# Patient Record
Sex: MALE | Race: BLACK OR AFRICAN AMERICAN | NOT HISPANIC OR LATINO | Employment: UNEMPLOYED | URBAN - METROPOLITAN AREA
[De-identification: names, ages, dates, MRNs, and addresses within clinical notes are randomized per-mention and may not be internally consistent; named-entity substitution may affect disease eponyms.]

---

## 2024-02-06 ENCOUNTER — CLINICAL SUPPORT (OUTPATIENT)
Dept: EMERGENCY MEDICINE | Facility: HOSPITAL | Age: 34
End: 2024-02-06
Payer: MEDICAID

## 2024-02-06 ENCOUNTER — HOSPITAL ENCOUNTER (INPATIENT)
Facility: HOSPITAL | Age: 34
LOS: 1 days | Discharge: HOME | End: 2024-02-08
Attending: EMERGENCY MEDICINE | Admitting: SURGERY
Payer: MEDICAID

## 2024-02-06 ENCOUNTER — APPOINTMENT (OUTPATIENT)
Dept: RADIOLOGY | Facility: HOSPITAL | Age: 34
End: 2024-02-06
Payer: MEDICAID

## 2024-02-06 DIAGNOSIS — L02.213 CHEST WALL ABSCESS: Primary | ICD-10-CM

## 2024-02-06 LAB
ABO GROUP (TYPE) IN BLOOD: NORMAL
ALBUMIN SERPL BCP-MCNC: 3.5 G/DL (ref 3.4–5)
ALP SERPL-CCNC: 102 U/L (ref 33–120)
ALT SERPL W P-5'-P-CCNC: 13 U/L (ref 10–52)
ANION GAP BLDV CALCULATED.4IONS-SCNC: 10 MMOL/L (ref 10–25)
ANION GAP SERPL CALC-SCNC: 21 MMOL/L (ref 10–20)
ANTIBODY SCREEN: NORMAL
AST SERPL W P-5'-P-CCNC: 21 U/L (ref 9–39)
BASE EXCESS BLDV CALC-SCNC: 4.3 MMOL/L (ref -2–3)
BASOPHILS # BLD AUTO: 0.1 X10*3/UL (ref 0–0.1)
BASOPHILS NFR BLD AUTO: 0.4 %
BILIRUB SERPL-MCNC: 0.4 MG/DL (ref 0–1.2)
BODY TEMPERATURE: 37 DEGREES CELSIUS
BUN SERPL-MCNC: 7 MG/DL (ref 6–23)
CA-I BLDV-SCNC: 1.15 MMOL/L (ref 1.1–1.33)
CALCIUM SERPL-MCNC: 9.1 MG/DL (ref 8.6–10.6)
CHLORIDE BLDV-SCNC: 97 MMOL/L (ref 98–107)
CHLORIDE SERPL-SCNC: 92 MMOL/L (ref 98–107)
CO2 SERPL-SCNC: 25 MMOL/L (ref 21–32)
CREAT SERPL-MCNC: 0.67 MG/DL (ref 0.5–1.3)
CREAT SERPL-MCNC: 0.69 MG/DL (ref 0.5–1.3)
CRP SERPL-MCNC: 24.55 MG/DL
EGFRCR SERPLBLD CKD-EPI 2021: >90 ML/MIN/1.73M*2
EGFRCR SERPLBLD CKD-EPI 2021: >90 ML/MIN/1.73M*2
EOSINOPHIL # BLD AUTO: 0.14 X10*3/UL (ref 0–0.7)
EOSINOPHIL NFR BLD AUTO: 0.5 %
ERYTHROCYTE [DISTWIDTH] IN BLOOD BY AUTOMATED COUNT: 15 % (ref 11.5–14.5)
ERYTHROCYTE [SEDIMENTATION RATE] IN BLOOD BY WESTERGREN METHOD: >130 MM/H (ref 0–15)
FIBRINOGEN PPP-MCNC: 805 MG/DL (ref 200–400)
GLUCOSE BLDV-MCNC: 126 MG/DL (ref 74–99)
GLUCOSE SERPL-MCNC: 107 MG/DL (ref 74–99)
HCO3 BLDV-SCNC: 29.2 MMOL/L (ref 22–26)
HCT VFR BLD AUTO: 27.1 % (ref 41–52)
HCT VFR BLD EST: 33 % (ref 41–52)
HGB BLD-MCNC: 9.3 G/DL (ref 13.5–17.5)
HGB BLDV-MCNC: 10.9 G/DL (ref 13.5–17.5)
HGB RETIC QN: 27 PG (ref 28–38)
IMM GRANULOCYTES # BLD AUTO: 0.21 X10*3/UL (ref 0–0.7)
IMM GRANULOCYTES NFR BLD AUTO: 0.8 % (ref 0–0.9)
IMMATURE RETIC FRACTION: 15.3 %
INR PPP: 1.8 (ref 0.9–1.1)
LACTATE BLDV-SCNC: 2 MMOL/L (ref 0.4–2)
LDH SERPL L TO P-CCNC: 162 U/L (ref 84–246)
LYMPHOCYTES # BLD AUTO: 4.44 X10*3/UL (ref 1.2–4.8)
LYMPHOCYTES NFR BLD AUTO: 16.5 %
MAGNESIUM SERPL-MCNC: 2.18 MG/DL (ref 1.6–2.4)
MCH RBC QN AUTO: 23 PG (ref 26–34)
MCHC RBC AUTO-ENTMCNC: 34.3 G/DL (ref 32–36)
MCV RBC AUTO: 67 FL (ref 80–100)
MONOCYTES # BLD AUTO: 1.78 X10*3/UL (ref 0.1–1)
MONOCYTES NFR BLD AUTO: 6.6 %
NEUTROPHILS # BLD AUTO: 20.16 X10*3/UL (ref 1.2–7.7)
NEUTROPHILS NFR BLD AUTO: 75.2 %
NRBC BLD-RTO: 0 /100 WBCS (ref 0–0)
OXYHGB MFR BLDV: 46.4 % (ref 45–75)
PCO2 BLDV: 44 MM HG (ref 41–51)
PH BLDV: 7.43 PH (ref 7.33–7.43)
PLATELET # BLD AUTO: 759 X10*3/UL (ref 150–450)
PO2 BLDV: 35 MM HG (ref 35–45)
POTASSIUM BLDV-SCNC: 4.4 MMOL/L (ref 3.5–5.3)
POTASSIUM SERPL-SCNC: 4.2 MMOL/L (ref 3.5–5.3)
PROT SERPL-MCNC: 8.7 G/DL (ref 6.4–8.2)
PROTHROMBIN TIME: 19.9 SECONDS (ref 9.8–12.8)
RBC # BLD AUTO: 4.05 X10*6/UL (ref 4.5–5.9)
RETICS #: 0.06 X10*6/UL (ref 0.02–0.12)
RETICS/RBC NFR AUTO: 1.4 % (ref 0.5–2)
RH FACTOR (ANTIGEN D): NORMAL
SAO2 % BLDV: 47 % (ref 45–75)
SODIUM BLDV-SCNC: 132 MMOL/L (ref 136–145)
SODIUM SERPL-SCNC: 134 MMOL/L (ref 136–145)
WBC # BLD AUTO: 26.8 X10*3/UL (ref 4.4–11.3)

## 2024-02-06 PROCEDURE — 83010 ASSAY OF HAPTOGLOBIN QUANT: CPT | Mod: WESLAB | Performed by: STUDENT IN AN ORGANIZED HEALTH CARE EDUCATION/TRAINING PROGRAM

## 2024-02-06 PROCEDURE — 85610 PROTHROMBIN TIME: CPT | Performed by: EMERGENCY MEDICINE

## 2024-02-06 PROCEDURE — 84132 ASSAY OF SERUM POTASSIUM: CPT | Performed by: EMERGENCY MEDICINE

## 2024-02-06 PROCEDURE — 71260 CT THORAX DX C+: CPT | Performed by: RADIOLOGY

## 2024-02-06 PROCEDURE — 70450 CT HEAD/BRAIN W/O DYE: CPT | Performed by: RADIOLOGY

## 2024-02-06 PROCEDURE — 86140 C-REACTIVE PROTEIN: CPT | Performed by: EMERGENCY MEDICINE

## 2024-02-06 PROCEDURE — 70450 CT HEAD/BRAIN W/O DYE: CPT

## 2024-02-06 PROCEDURE — 96365 THER/PROPH/DIAG IV INF INIT: CPT

## 2024-02-06 PROCEDURE — 85025 COMPLETE CBC W/AUTO DIFF WBC: CPT | Performed by: EMERGENCY MEDICINE

## 2024-02-06 PROCEDURE — 84132 ASSAY OF SERUM POTASSIUM: CPT

## 2024-02-06 PROCEDURE — 83735 ASSAY OF MAGNESIUM: CPT | Performed by: EMERGENCY MEDICINE

## 2024-02-06 PROCEDURE — 96367 TX/PROPH/DG ADDL SEQ IV INF: CPT

## 2024-02-06 PROCEDURE — 85652 RBC SED RATE AUTOMATED: CPT | Performed by: EMERGENCY MEDICINE

## 2024-02-06 PROCEDURE — 85384 FIBRINOGEN ACTIVITY: CPT | Performed by: STUDENT IN AN ORGANIZED HEALTH CARE EDUCATION/TRAINING PROGRAM

## 2024-02-06 PROCEDURE — 83615 LACTATE (LD) (LDH) ENZYME: CPT | Performed by: STUDENT IN AN ORGANIZED HEALTH CARE EDUCATION/TRAINING PROGRAM

## 2024-02-06 PROCEDURE — 93010 ELECTROCARDIOGRAM REPORT: CPT | Performed by: EMERGENCY MEDICINE

## 2024-02-06 PROCEDURE — 2500000004 HC RX 250 GENERAL PHARMACY W/ HCPCS (ALT 636 FOR OP/ED)

## 2024-02-06 PROCEDURE — 87040 BLOOD CULTURE FOR BACTERIA: CPT | Performed by: EMERGENCY MEDICINE

## 2024-02-06 PROCEDURE — 70491 CT SOFT TISSUE NECK W/DYE: CPT

## 2024-02-06 PROCEDURE — 36415 COLL VENOUS BLD VENIPUNCTURE: CPT | Performed by: EMERGENCY MEDICINE

## 2024-02-06 PROCEDURE — 82565 ASSAY OF CREATININE: CPT | Performed by: EMERGENCY MEDICINE

## 2024-02-06 PROCEDURE — 85045 AUTOMATED RETICULOCYTE COUNT: CPT | Performed by: STUDENT IN AN ORGANIZED HEALTH CARE EDUCATION/TRAINING PROGRAM

## 2024-02-06 PROCEDURE — 99291 CRITICAL CARE FIRST HOUR: CPT | Mod: 25 | Performed by: EMERGENCY MEDICINE

## 2024-02-06 PROCEDURE — 86901 BLOOD TYPING SEROLOGIC RH(D): CPT | Performed by: EMERGENCY MEDICINE

## 2024-02-06 PROCEDURE — 93005 ELECTROCARDIOGRAM TRACING: CPT

## 2024-02-06 PROCEDURE — 2550000001 HC RX 255 CONTRASTS: Performed by: EMERGENCY MEDICINE

## 2024-02-06 PROCEDURE — 70491 CT SOFT TISSUE NECK W/DYE: CPT | Performed by: RADIOLOGY

## 2024-02-06 PROCEDURE — 2500000004 HC RX 250 GENERAL PHARMACY W/ HCPCS (ALT 636 FOR OP/ED): Performed by: EMERGENCY MEDICINE

## 2024-02-06 PROCEDURE — 99285 EMERGENCY DEPT VISIT HI MDM: CPT | Performed by: EMERGENCY MEDICINE

## 2024-02-06 PROCEDURE — 71260 CT THORAX DX C+: CPT

## 2024-02-06 RX ORDER — HYDROMORPHONE HYDROCHLORIDE 1 MG/ML
INJECTION, SOLUTION INTRAMUSCULAR; INTRAVENOUS; SUBCUTANEOUS
Status: COMPLETED
Start: 2024-02-06 | End: 2024-02-06

## 2024-02-06 RX ORDER — SODIUM CHLORIDE, SODIUM LACTATE, POTASSIUM CHLORIDE, CALCIUM CHLORIDE 600; 310; 30; 20 MG/100ML; MG/100ML; MG/100ML; MG/100ML
125 INJECTION, SOLUTION INTRAVENOUS CONTINUOUS
Status: DISCONTINUED | OUTPATIENT
Start: 2024-02-06 | End: 2024-02-07

## 2024-02-06 RX ORDER — HYDROMORPHONE HYDROCHLORIDE 1 MG/ML
0.2 INJECTION, SOLUTION INTRAMUSCULAR; INTRAVENOUS; SUBCUTANEOUS ONCE
Status: COMPLETED | OUTPATIENT
Start: 2024-02-06 | End: 2024-02-06

## 2024-02-06 RX ORDER — VANCOMYCIN HYDROCHLORIDE 750 MG/150ML
1500 INJECTION, SOLUTION INTRAVENOUS ONCE
Status: COMPLETED | OUTPATIENT
Start: 2024-02-06 | End: 2024-02-06

## 2024-02-06 RX ORDER — LIDOCAINE HYDROCHLORIDE 10 MG/ML
10 INJECTION INFILTRATION; PERINEURAL ONCE
Status: DISCONTINUED | OUTPATIENT
Start: 2024-02-06 | End: 2024-02-08 | Stop reason: HOSPADM

## 2024-02-06 RX ADMIN — SODIUM CHLORIDE, POTASSIUM CHLORIDE, SODIUM LACTATE AND CALCIUM CHLORIDE 1000 ML: 600; 310; 30; 20 INJECTION, SOLUTION INTRAVENOUS at 20:17

## 2024-02-06 RX ADMIN — HYDROMORPHONE HYDROCHLORIDE 0.2 MG: 1 INJECTION, SOLUTION INTRAMUSCULAR; INTRAVENOUS; SUBCUTANEOUS at 20:31

## 2024-02-06 RX ADMIN — IOHEXOL 100 ML: 350 INJECTION, SOLUTION INTRAVENOUS at 22:05

## 2024-02-06 RX ADMIN — VANCOMYCIN HYDROCHLORIDE 1500 MG: 750 INJECTION, SOLUTION INTRAVENOUS at 21:10

## 2024-02-06 RX ADMIN — PIPERACILLIN SODIUM AND TAZOBACTAM SODIUM 4.5 G: 4; .5 INJECTION, SOLUTION INTRAVENOUS at 20:22

## 2024-02-06 ASSESSMENT — COLUMBIA-SUICIDE SEVERITY RATING SCALE - C-SSRS
6. HAVE YOU EVER DONE ANYTHING, STARTED TO DO ANYTHING, OR PREPARED TO DO ANYTHING TO END YOUR LIFE?: NO
2. HAVE YOU ACTUALLY HAD ANY THOUGHTS OF KILLING YOURSELF?: NO
1. IN THE PAST MONTH, HAVE YOU WISHED YOU WERE DEAD OR WISHED YOU COULD GO TO SLEEP AND NOT WAKE UP?: NO

## 2024-02-07 ENCOUNTER — ANESTHESIA (OUTPATIENT)
Dept: OPERATING ROOM | Facility: HOSPITAL | Age: 34
End: 2024-02-07
Payer: MEDICAID

## 2024-02-07 ENCOUNTER — ANESTHESIA EVENT (OUTPATIENT)
Dept: OPERATING ROOM | Facility: HOSPITAL | Age: 34
End: 2024-02-07
Payer: MEDICAID

## 2024-02-07 PROBLEM — F41.9 ANXIETY: Status: ACTIVE | Noted: 2024-02-07

## 2024-02-07 PROBLEM — L02.213 CHEST WALL ABSCESS: Status: ACTIVE | Noted: 2024-02-06

## 2024-02-07 LAB
ANION GAP SERPL CALC-SCNC: 10 MMOL/L (ref 10–20)
ATRIAL RATE: 112 BPM
BASOPHILS # BLD AUTO: 0.07 X10*3/UL (ref 0–0.1)
BASOPHILS NFR BLD AUTO: 0.4 %
BUN SERPL-MCNC: 5 MG/DL (ref 6–23)
CALCIUM SERPL-MCNC: 8.8 MG/DL (ref 8.6–10.6)
CHLORIDE SERPL-SCNC: 99 MMOL/L (ref 98–107)
CO2 SERPL-SCNC: 30 MMOL/L (ref 21–32)
CREAT SERPL-MCNC: 0.67 MG/DL (ref 0.5–1.3)
CRP SERPL-MCNC: 17.73 MG/DL
EGFRCR SERPLBLD CKD-EPI 2021: >90 ML/MIN/1.73M*2
EOSINOPHIL # BLD AUTO: 0.09 X10*3/UL (ref 0–0.7)
EOSINOPHIL NFR BLD AUTO: 0.5 %
ERYTHROCYTE [DISTWIDTH] IN BLOOD BY AUTOMATED COUNT: 15.7 % (ref 11.5–14.5)
GLUCOSE SERPL-MCNC: 127 MG/DL (ref 74–99)
HAPTOGLOB SERPL-MCNC: 625 MG/DL (ref 37–246)
HCT VFR BLD AUTO: 26.1 % (ref 41–52)
HGB BLD-MCNC: 8.5 G/DL (ref 13.5–17.5)
IMM GRANULOCYTES # BLD AUTO: 0.17 X10*3/UL (ref 0–0.7)
IMM GRANULOCYTES NFR BLD AUTO: 0.9 % (ref 0–0.9)
LYMPHOCYTES # BLD AUTO: 1.63 X10*3/UL (ref 1.2–4.8)
LYMPHOCYTES NFR BLD AUTO: 8.9 %
MAGNESIUM SERPL-MCNC: 2.16 MG/DL (ref 1.6–2.4)
MCH RBC QN AUTO: 23 PG (ref 26–34)
MCHC RBC AUTO-ENTMCNC: 32.6 G/DL (ref 32–36)
MCV RBC AUTO: 71 FL (ref 80–100)
MONOCYTES # BLD AUTO: 1.03 X10*3/UL (ref 0.1–1)
MONOCYTES NFR BLD AUTO: 5.6 %
NEUTROPHILS # BLD AUTO: 15.4 X10*3/UL (ref 1.2–7.7)
NEUTROPHILS NFR BLD AUTO: 83.7 %
NRBC BLD-RTO: 0 /100 WBCS (ref 0–0)
P AXIS: 63 DEGREES
P OFFSET: 191 MS
P ONSET: 140 MS
PLATELET # BLD AUTO: 605 X10*3/UL (ref 150–450)
POTASSIUM SERPL-SCNC: 3.4 MMOL/L (ref 3.5–5.3)
PR INTERVAL: 140 MS
Q ONSET: 210 MS
QRS COUNT: 18 BEATS
QRS DURATION: 90 MS
QT INTERVAL: 316 MS
QTC CALCULATION(BAZETT): 431 MS
QTC FREDERICIA: 389 MS
R AXIS: -28 DEGREES
RBC # BLD AUTO: 3.7 X10*6/UL (ref 4.5–5.9)
SODIUM SERPL-SCNC: 136 MMOL/L (ref 136–145)
T AXIS: 77 DEGREES
T OFFSET: 368 MS
VENTRICULAR RATE: 112 BPM
WBC # BLD AUTO: 18.4 X10*3/UL (ref 4.4–11.3)

## 2024-02-07 PROCEDURE — A4217 STERILE WATER/SALINE, 500 ML: HCPCS

## 2024-02-07 PROCEDURE — A4217 STERILE WATER/SALINE, 500 ML: HCPCS | Performed by: SURGERY

## 2024-02-07 PROCEDURE — 99222 1ST HOSP IP/OBS MODERATE 55: CPT | Performed by: SURGERY

## 2024-02-07 PROCEDURE — 3700000002 HC GENERAL ANESTHESIA TIME - EACH INCREMENTAL 1 MINUTE: Performed by: SURGERY

## 2024-02-07 PROCEDURE — 83735 ASSAY OF MAGNESIUM: CPT | Performed by: STUDENT IN AN ORGANIZED HEALTH CARE EDUCATION/TRAINING PROGRAM

## 2024-02-07 PROCEDURE — 2500000004 HC RX 250 GENERAL PHARMACY W/ HCPCS (ALT 636 FOR OP/ED)

## 2024-02-07 PROCEDURE — 2500000004 HC RX 250 GENERAL PHARMACY W/ HCPCS (ALT 636 FOR OP/ED): Performed by: SURGERY

## 2024-02-07 PROCEDURE — 90791 PSYCH DIAGNOSTIC EVALUATION: CPT | Performed by: PSYCHOLOGIST

## 2024-02-07 PROCEDURE — 80048 BASIC METABOLIC PNL TOTAL CA: CPT | Performed by: STUDENT IN AN ORGANIZED HEALTH CARE EDUCATION/TRAINING PROGRAM

## 2024-02-07 PROCEDURE — 10061 I&D ABSCESS COMP/MULTIPLE: CPT | Mod: GC | Performed by: SURGERY

## 2024-02-07 PROCEDURE — 2720000007 HC OR 272 NO HCPCS: Performed by: SURGERY

## 2024-02-07 PROCEDURE — 87070 CULTURE OTHR SPECIMN AEROBIC: CPT | Performed by: SURGERY

## 2024-02-07 PROCEDURE — 3700000001 HC GENERAL ANESTHESIA TIME - INITIAL BASE CHARGE: Performed by: SURGERY

## 2024-02-07 PROCEDURE — A10061 PR DRAIN SKIN ABSCESS COMPLIC: Performed by: ANESTHESIOLOGY

## 2024-02-07 PROCEDURE — 2500000004 HC RX 250 GENERAL PHARMACY W/ HCPCS (ALT 636 FOR OP/ED): Performed by: STUDENT IN AN ORGANIZED HEALTH CARE EDUCATION/TRAINING PROGRAM

## 2024-02-07 PROCEDURE — 1100000001 HC PRIVATE ROOM DAILY

## 2024-02-07 PROCEDURE — 2500000005 HC RX 250 GENERAL PHARMACY W/O HCPCS: Performed by: SURGERY

## 2024-02-07 PROCEDURE — 10061 I&D ABSCESS COMP/MULTIPLE: CPT | Performed by: SURGERY

## 2024-02-07 PROCEDURE — 7100000001 HC RECOVERY ROOM TIME - INITIAL BASE CHARGE: Performed by: SURGERY

## 2024-02-07 PROCEDURE — 85025 COMPLETE CBC W/AUTO DIFF WBC: CPT | Performed by: STUDENT IN AN ORGANIZED HEALTH CARE EDUCATION/TRAINING PROGRAM

## 2024-02-07 PROCEDURE — 3600000008 HC OR TIME - EACH INCREMENTAL 1 MINUTE - PROCEDURE LEVEL THREE: Performed by: SURGERY

## 2024-02-07 PROCEDURE — 36415 COLL VENOUS BLD VENIPUNCTURE: CPT | Performed by: STUDENT IN AN ORGANIZED HEALTH CARE EDUCATION/TRAINING PROGRAM

## 2024-02-07 PROCEDURE — 7100000002 HC RECOVERY ROOM TIME - EACH INCREMENTAL 1 MINUTE: Performed by: SURGERY

## 2024-02-07 PROCEDURE — 0W980ZZ DRAINAGE OF CHEST WALL, OPEN APPROACH: ICD-10-PCS | Performed by: SURGERY

## 2024-02-07 PROCEDURE — 99140 ANES COMP EMERGENCY COND: CPT | Performed by: ANESTHESIOLOGY

## 2024-02-07 PROCEDURE — 2500000004 HC RX 250 GENERAL PHARMACY W/ HCPCS (ALT 636 FOR OP/ED): Performed by: EMERGENCY MEDICINE

## 2024-02-07 PROCEDURE — 2500000004 HC RX 250 GENERAL PHARMACY W/ HCPCS (ALT 636 FOR OP/ED): Performed by: NURSE PRACTITIONER

## 2024-02-07 PROCEDURE — 86140 C-REACTIVE PROTEIN: CPT | Performed by: STUDENT IN AN ORGANIZED HEALTH CARE EDUCATION/TRAINING PROGRAM

## 2024-02-07 PROCEDURE — 2500000001 HC RX 250 WO HCPCS SELF ADMINISTERED DRUGS (ALT 637 FOR MEDICARE OP): Performed by: STUDENT IN AN ORGANIZED HEALTH CARE EDUCATION/TRAINING PROGRAM

## 2024-02-07 PROCEDURE — 3600000003 HC OR TIME - INITIAL BASE CHARGE - PROCEDURE LEVEL THREE: Performed by: SURGERY

## 2024-02-07 RX ORDER — ACETAMINOPHEN 325 MG/1
650 TABLET ORAL EVERY 6 HOURS
Status: DISCONTINUED | OUTPATIENT
Start: 2024-02-07 | End: 2024-02-08 | Stop reason: HOSPADM

## 2024-02-07 RX ORDER — HYDROMORPHONE HYDROCHLORIDE 1 MG/ML
0.2 INJECTION, SOLUTION INTRAMUSCULAR; INTRAVENOUS; SUBCUTANEOUS EVERY 5 MIN PRN
Status: DISCONTINUED | OUTPATIENT
Start: 2024-02-07 | End: 2024-02-07 | Stop reason: HOSPADM

## 2024-02-07 RX ORDER — LABETALOL HYDROCHLORIDE 5 MG/ML
5 INJECTION, SOLUTION INTRAVENOUS ONCE AS NEEDED
Status: DISCONTINUED | OUTPATIENT
Start: 2024-02-07 | End: 2024-02-07 | Stop reason: HOSPADM

## 2024-02-07 RX ORDER — FENTANYL CITRATE 50 UG/ML
INJECTION, SOLUTION INTRAMUSCULAR; INTRAVENOUS AS NEEDED
Status: DISCONTINUED | OUTPATIENT
Start: 2024-02-07 | End: 2024-02-07

## 2024-02-07 RX ORDER — IBUPROFEN 600 MG/1
600 TABLET ORAL EVERY 6 HOURS
Status: DISCONTINUED | OUTPATIENT
Start: 2024-02-07 | End: 2024-02-08 | Stop reason: HOSPADM

## 2024-02-07 RX ORDER — HYDROMORPHONE HYDROCHLORIDE 1 MG/ML
0.2 INJECTION, SOLUTION INTRAMUSCULAR; INTRAVENOUS; SUBCUTANEOUS ONCE
Status: COMPLETED | OUTPATIENT
Start: 2024-02-07 | End: 2024-02-07

## 2024-02-07 RX ORDER — OXYCODONE HYDROCHLORIDE 5 MG/1
5 TABLET ORAL EVERY 6 HOURS PRN
Status: DISCONTINUED | OUTPATIENT
Start: 2024-02-07 | End: 2024-02-08 | Stop reason: HOSPADM

## 2024-02-07 RX ORDER — HEPARIN SODIUM 5000 [USP'U]/ML
INJECTION, SOLUTION INTRAVENOUS; SUBCUTANEOUS AS NEEDED
Status: DISCONTINUED | OUTPATIENT
Start: 2024-02-07 | End: 2024-02-07

## 2024-02-07 RX ORDER — SODIUM CHLORIDE 0.9 G/100ML
IRRIGANT IRRIGATION AS NEEDED
Status: DISCONTINUED | OUTPATIENT
Start: 2024-02-07 | End: 2024-02-07 | Stop reason: HOSPADM

## 2024-02-07 RX ORDER — PROPOFOL 10 MG/ML
INJECTION, EMULSION INTRAVENOUS CONTINUOUS PRN
Status: DISCONTINUED | OUTPATIENT
Start: 2024-02-07 | End: 2024-02-07

## 2024-02-07 RX ORDER — LIDOCAINE HYDROCHLORIDE 5 MG/ML
INJECTION, SOLUTION INFILTRATION; PERINEURAL AS NEEDED
Status: DISCONTINUED | OUTPATIENT
Start: 2024-02-07 | End: 2024-02-07 | Stop reason: HOSPADM

## 2024-02-07 RX ORDER — POTASSIUM CHLORIDE 20 MEQ/1
40 TABLET, EXTENDED RELEASE ORAL ONCE
Status: COMPLETED | OUTPATIENT
Start: 2024-02-07 | End: 2024-02-07

## 2024-02-07 RX ORDER — POLYETHYLENE GLYCOL 3350 17 G/17G
17 POWDER, FOR SOLUTION ORAL DAILY
Status: DISCONTINUED | OUTPATIENT
Start: 2024-02-07 | End: 2024-02-08 | Stop reason: HOSPADM

## 2024-02-07 RX ORDER — PROPOFOL 10 MG/ML
INJECTION, EMULSION INTRAVENOUS AS NEEDED
Status: DISCONTINUED | OUTPATIENT
Start: 2024-02-07 | End: 2024-02-07

## 2024-02-07 RX ORDER — HYDROMORPHONE HYDROCHLORIDE 1 MG/ML
0.5 INJECTION, SOLUTION INTRAMUSCULAR; INTRAVENOUS; SUBCUTANEOUS EVERY 5 MIN PRN
Status: DISCONTINUED | OUTPATIENT
Start: 2024-02-07 | End: 2024-02-07 | Stop reason: HOSPADM

## 2024-02-07 RX ORDER — KETOROLAC TROMETHAMINE 15 MG/ML
15 INJECTION, SOLUTION INTRAMUSCULAR; INTRAVENOUS ONCE
Status: COMPLETED | OUTPATIENT
Start: 2024-02-07 | End: 2024-02-07

## 2024-02-07 RX ORDER — ONDANSETRON HYDROCHLORIDE 2 MG/ML
4 INJECTION, SOLUTION INTRAVENOUS ONCE AS NEEDED
Status: DISCONTINUED | OUTPATIENT
Start: 2024-02-07 | End: 2024-02-07 | Stop reason: HOSPADM

## 2024-02-07 RX ORDER — SODIUM CHLORIDE, SODIUM LACTATE, POTASSIUM CHLORIDE, CALCIUM CHLORIDE 600; 310; 30; 20 MG/100ML; MG/100ML; MG/100ML; MG/100ML
50 INJECTION, SOLUTION INTRAVENOUS CONTINUOUS
Status: DISCONTINUED | OUTPATIENT
Start: 2024-02-07 | End: 2024-02-08

## 2024-02-07 RX ORDER — HEPARIN SODIUM 5000 [USP'U]/ML
5000 INJECTION, SOLUTION INTRAVENOUS; SUBCUTANEOUS EVERY 8 HOURS
Status: DISCONTINUED | OUTPATIENT
Start: 2024-02-07 | End: 2024-02-08 | Stop reason: HOSPADM

## 2024-02-07 RX ORDER — ONDANSETRON HYDROCHLORIDE 2 MG/ML
INJECTION, SOLUTION INTRAVENOUS AS NEEDED
Status: DISCONTINUED | OUTPATIENT
Start: 2024-02-07 | End: 2024-02-07

## 2024-02-07 RX ORDER — SODIUM CHLORIDE, SODIUM LACTATE, POTASSIUM CHLORIDE, CALCIUM CHLORIDE 600; 310; 30; 20 MG/100ML; MG/100ML; MG/100ML; MG/100ML
100 INJECTION, SOLUTION INTRAVENOUS CONTINUOUS
Status: DISCONTINUED | OUTPATIENT
Start: 2024-02-07 | End: 2024-02-07 | Stop reason: HOSPADM

## 2024-02-07 RX ORDER — KETOROLAC TROMETHAMINE 15 MG/ML
15 INJECTION, SOLUTION INTRAMUSCULAR; INTRAVENOUS EVERY 6 HOURS SCHEDULED
Status: DISCONTINUED | OUTPATIENT
Start: 2024-02-07 | End: 2024-02-08 | Stop reason: HOSPADM

## 2024-02-07 RX ORDER — ACETAMINOPHEN 325 MG/1
650 TABLET ORAL EVERY 4 HOURS PRN
Status: DISCONTINUED | OUTPATIENT
Start: 2024-02-07 | End: 2024-02-07 | Stop reason: HOSPADM

## 2024-02-07 RX ORDER — BUPIVACAINE HYDROCHLORIDE 2.5 MG/ML
INJECTION, SOLUTION INFILTRATION; PERINEURAL AS NEEDED
Status: DISCONTINUED | OUTPATIENT
Start: 2024-02-07 | End: 2024-02-07 | Stop reason: HOSPADM

## 2024-02-07 RX ORDER — MIDAZOLAM HYDROCHLORIDE 1 MG/ML
INJECTION INTRAMUSCULAR; INTRAVENOUS AS NEEDED
Status: DISCONTINUED | OUTPATIENT
Start: 2024-02-07 | End: 2024-02-07

## 2024-02-07 RX ADMIN — PROPOFOL 30 MG: 10 INJECTION, EMULSION INTRAVENOUS at 01:43

## 2024-02-07 RX ADMIN — KETOROLAC TROMETHAMINE 15 MG: 15 INJECTION, SOLUTION INTRAMUSCULAR; INTRAVENOUS at 17:33

## 2024-02-07 RX ADMIN — HEPARIN SODIUM 5000 UNITS: 5000 INJECTION INTRAVENOUS; SUBCUTANEOUS at 01:24

## 2024-02-07 RX ADMIN — ACETAMINOPHEN 650 MG: 325 TABLET ORAL at 16:12

## 2024-02-07 RX ADMIN — ACETAMINOPHEN 650 MG: 325 TABLET ORAL at 04:03

## 2024-02-07 RX ADMIN — FENTANYL CITRATE 25 MCG: 50 INJECTION, SOLUTION INTRAMUSCULAR; INTRAVENOUS at 02:04

## 2024-02-07 RX ADMIN — PROPOFOL 30 MG: 10 INJECTION, EMULSION INTRAVENOUS at 01:36

## 2024-02-07 RX ADMIN — ACETAMINOPHEN 650 MG: 325 TABLET ORAL at 21:11

## 2024-02-07 RX ADMIN — FENTANYL CITRATE 50 MCG: 50 INJECTION, SOLUTION INTRAMUSCULAR; INTRAVENOUS at 01:25

## 2024-02-07 RX ADMIN — SODIUM CHLORIDE, POTASSIUM CHLORIDE, SODIUM LACTATE AND CALCIUM CHLORIDE 100 ML/HR: 600; 310; 30; 20 INJECTION, SOLUTION INTRAVENOUS at 02:59

## 2024-02-07 RX ADMIN — PROPOFOL 30 MG: 10 INJECTION, EMULSION INTRAVENOUS at 01:23

## 2024-02-07 RX ADMIN — PIPERACILLIN SODIUM AND TAZOBACTAM SODIUM 3.38 G: 3; .375 INJECTION, SOLUTION INTRAVENOUS at 03:06

## 2024-02-07 RX ADMIN — PROPOFOL 30 MG: 10 INJECTION, EMULSION INTRAVENOUS at 01:56

## 2024-02-07 RX ADMIN — PIPERACILLIN SODIUM AND TAZOBACTAM SODIUM 3.38 G: 3; .375 INJECTION, SOLUTION INTRAVENOUS at 21:11

## 2024-02-07 RX ADMIN — VANCOMYCIN HYDROCHLORIDE 1250 MG: 5 INJECTION, POWDER, LYOPHILIZED, FOR SOLUTION INTRAVENOUS at 10:44

## 2024-02-07 RX ADMIN — HYDROMORPHONE HYDROCHLORIDE 0.2 MG: 1 INJECTION, SOLUTION INTRAMUSCULAR; INTRAVENOUS; SUBCUTANEOUS at 13:40

## 2024-02-07 RX ADMIN — IBUPROFEN 600 MG: 600 TABLET, FILM COATED ORAL at 21:11

## 2024-02-07 RX ADMIN — KETOROLAC TROMETHAMINE 15 MG: 15 INJECTION, SOLUTION INTRAMUSCULAR; INTRAVENOUS at 00:14

## 2024-02-07 RX ADMIN — PROPOFOL 30 MG: 10 INJECTION, EMULSION INTRAVENOUS at 01:30

## 2024-02-07 RX ADMIN — PROPOFOL 50 MCG/KG/MIN: 10 INJECTION, EMULSION INTRAVENOUS at 01:21

## 2024-02-07 RX ADMIN — SODIUM CHLORIDE, POTASSIUM CHLORIDE, SODIUM LACTATE AND CALCIUM CHLORIDE 50 ML/HR: 600; 310; 30; 20 INJECTION, SOLUTION INTRAVENOUS at 04:00

## 2024-02-07 RX ADMIN — PIPERACILLIN SODIUM AND TAZOBACTAM SODIUM 3.38 G: 3; .375 INJECTION, SOLUTION INTRAVENOUS at 09:37

## 2024-02-07 RX ADMIN — SODIUM CHLORIDE, SODIUM LACTATE, POTASSIUM CHLORIDE, AND CALCIUM CHLORIDE: 600; 310; 30; 20 INJECTION, SOLUTION INTRAVENOUS at 01:21

## 2024-02-07 RX ADMIN — PROPOFOL 30 MG: 10 INJECTION, EMULSION INTRAVENOUS at 01:38

## 2024-02-07 RX ADMIN — ONDANSETRON 4 MG: 2 INJECTION, SOLUTION INTRAMUSCULAR; INTRAVENOUS at 02:05

## 2024-02-07 RX ADMIN — FENTANYL CITRATE 50 MCG: 50 INJECTION, SOLUTION INTRAMUSCULAR; INTRAVENOUS at 01:45

## 2024-02-07 RX ADMIN — SODIUM CHLORIDE, POTASSIUM CHLORIDE, SODIUM LACTATE AND CALCIUM CHLORIDE 125 ML/HR: 600; 310; 30; 20 INJECTION, SOLUTION INTRAVENOUS at 00:06

## 2024-02-07 RX ADMIN — PROPOFOL 30 MG: 10 INJECTION, EMULSION INTRAVENOUS at 01:51

## 2024-02-07 RX ADMIN — IBUPROFEN 600 MG: 600 TABLET, FILM COATED ORAL at 04:03

## 2024-02-07 RX ADMIN — POTASSIUM CHLORIDE 40 MEQ: 1500 TABLET, EXTENDED RELEASE ORAL at 09:34

## 2024-02-07 RX ADMIN — ACETAMINOPHEN 650 MG: 325 TABLET ORAL at 09:34

## 2024-02-07 RX ADMIN — PROPOFOL 30 MG: 10 INJECTION, EMULSION INTRAVENOUS at 01:47

## 2024-02-07 RX ADMIN — PIPERACILLIN SODIUM AND TAZOBACTAM SODIUM 3.38 G: 3; .375 INJECTION, SOLUTION INTRAVENOUS at 16:11

## 2024-02-07 RX ADMIN — VANCOMYCIN HYDROCHLORIDE 1250 MG: 5 INJECTION, POWDER, LYOPHILIZED, FOR SOLUTION INTRAVENOUS at 21:11

## 2024-02-07 RX ADMIN — PROPOFOL 20 MG: 10 INJECTION, EMULSION INTRAVENOUS at 01:24

## 2024-02-07 RX ADMIN — FENTANYL CITRATE 50 MCG: 50 INJECTION, SOLUTION INTRAMUSCULAR; INTRAVENOUS at 01:22

## 2024-02-07 RX ADMIN — KETOROLAC TROMETHAMINE 15 MG: 15 INJECTION, SOLUTION INTRAMUSCULAR; INTRAVENOUS at 09:34

## 2024-02-07 RX ADMIN — FENTANYL CITRATE 25 MCG: 50 INJECTION, SOLUTION INTRAMUSCULAR; INTRAVENOUS at 02:00

## 2024-02-07 RX ADMIN — MIDAZOLAM HYDROCHLORIDE 2 MG: 1 INJECTION, SOLUTION INTRAMUSCULAR; INTRAVENOUS at 01:20

## 2024-02-07 SDOH — SOCIAL STABILITY: SOCIAL INSECURITY: ABUSE: ADULT

## 2024-02-07 SDOH — SOCIAL STABILITY: SOCIAL INSECURITY: HAS ANYONE EVER THREATENED TO HURT YOUR FAMILY OR YOUR PETS?: NO

## 2024-02-07 SDOH — SOCIAL STABILITY: SOCIAL INSECURITY: DO YOU FEEL ANYONE HAS EXPLOITED OR TAKEN ADVANTAGE OF YOU FINANCIALLY OR OF YOUR PERSONAL PROPERTY?: NO

## 2024-02-07 SDOH — SOCIAL STABILITY: SOCIAL INSECURITY: DOES ANYONE TRY TO KEEP YOU FROM HAVING/CONTACTING OTHER FRIENDS OR DOING THINGS OUTSIDE YOUR HOME?: NO

## 2024-02-07 SDOH — SOCIAL STABILITY: SOCIAL INSECURITY: WERE YOU ABLE TO COMPLETE ALL THE BEHAVIORAL HEALTH SCREENINGS?: YES

## 2024-02-07 SDOH — SOCIAL STABILITY: SOCIAL INSECURITY: ARE THERE ANY APPARENT SIGNS OF INJURIES/BEHAVIORS THAT COULD BE RELATED TO ABUSE/NEGLECT?: NO

## 2024-02-07 SDOH — SOCIAL STABILITY: SOCIAL INSECURITY: HAVE YOU HAD THOUGHTS OF HARMING ANYONE ELSE?: NO

## 2024-02-07 SDOH — SOCIAL STABILITY: SOCIAL INSECURITY: DO YOU FEEL UNSAFE GOING BACK TO THE PLACE WHERE YOU ARE LIVING?: NO

## 2024-02-07 SDOH — SOCIAL STABILITY: SOCIAL INSECURITY: ARE YOU OR HAVE YOU BEEN THREATENED OR ABUSED PHYSICALLY, EMOTIONALLY, OR SEXUALLY BY ANYONE?: NO

## 2024-02-07 ASSESSMENT — LIFESTYLE VARIABLES
AUDIT-C TOTAL SCORE: 1
AUDIT-C TOTAL SCORE: 1
SKIP TO QUESTIONS 9-10: 1
HOW OFTEN DO YOU HAVE 6 OR MORE DRINKS ON ONE OCCASION: NEVER
PRESCIPTION_ABUSE_PAST_12_MONTHS: NO
SUBSTANCE_ABUSE_PAST_12_MONTHS: NO
HOW OFTEN DO YOU HAVE A DRINK CONTAINING ALCOHOL: MONTHLY OR LESS
HOW MANY STANDARD DRINKS CONTAINING ALCOHOL DO YOU HAVE ON A TYPICAL DAY: 1 OR 2

## 2024-02-07 ASSESSMENT — COGNITIVE AND FUNCTIONAL STATUS - GENERAL
TURNING FROM BACK TO SIDE WHILE IN FLAT BAD: A LITTLE
DRESSING REGULAR UPPER BODY CLOTHING: A LITTLE
WALKING IN HOSPITAL ROOM: A LITTLE
STANDING UP FROM CHAIR USING ARMS: A LITTLE
PATIENT BASELINE BEDBOUND: NO
DAILY ACTIVITIY SCORE: 22
MOVING TO AND FROM BED TO CHAIR: A LITTLE
MOVING FROM LYING ON BACK TO SITTING ON SIDE OF FLAT BED WITH BEDRAILS: A LITTLE
MOBILITY SCORE: 24
MOBILITY SCORE: 19
DRESSING REGULAR LOWER BODY CLOTHING: A LITTLE
DAILY ACTIVITIY SCORE: 24

## 2024-02-07 ASSESSMENT — PAIN DESCRIPTION - ORIENTATION
ORIENTATION: RIGHT

## 2024-02-07 ASSESSMENT — PAIN SCALES - GENERAL
PAINLEVEL_OUTOF10: 4
PAINLEVEL_OUTOF10: 3
PAINLEVEL_OUTOF10: 9
PAINLEVEL_OUTOF10: 4
PAINLEVEL_OUTOF10: 2
PAINLEVEL_OUTOF10: 4
PAINLEVEL_OUTOF10: 5 - MODERATE PAIN
PAINLEVEL_OUTOF10: 3
PAINLEVEL_OUTOF10: 4
PAINLEVEL_OUTOF10: 6
PAINLEVEL_OUTOF10: 0 - NO PAIN
PAINLEVEL_OUTOF10: 6
PAINLEVEL_OUTOF10: 3

## 2024-02-07 ASSESSMENT — ACTIVITIES OF DAILY LIVING (ADL)
TOILETING: INDEPENDENT
ADEQUATE_TO_COMPLETE_ADL: YES
WALKS IN HOME: INDEPENDENT
HEARING - LEFT EAR: FUNCTIONAL
LACK_OF_TRANSPORTATION: NO
PATIENT'S MEMORY ADEQUATE TO SAFELY COMPLETE DAILY ACTIVITIES?: YES
BATHING: INDEPENDENT
DRESSING YOURSELF: INDEPENDENT
JUDGMENT_ADEQUATE_SAFELY_COMPLETE_DAILY_ACTIVITIES: YES
GROOMING: INDEPENDENT
FEEDING YOURSELF: INDEPENDENT
HEARING - RIGHT EAR: FUNCTIONAL

## 2024-02-07 ASSESSMENT — PAIN DESCRIPTION - LOCATION
LOCATION: CHEST

## 2024-02-07 ASSESSMENT — PAIN - FUNCTIONAL ASSESSMENT
PAIN_FUNCTIONAL_ASSESSMENT: 0-10

## 2024-02-07 ASSESSMENT — PAIN DESCRIPTION - DESCRIPTORS
DESCRIPTORS: THROBBING
DESCRIPTORS: DISCOMFORT;SORE

## 2024-02-07 ASSESSMENT — PAIN SCALES - PAIN ASSESSMENT IN ADVANCED DEMENTIA (PAINAD)
BREATHING: NORMAL

## 2024-02-07 ASSESSMENT — PATIENT HEALTH QUESTIONNAIRE - PHQ9
SUM OF ALL RESPONSES TO PHQ9 QUESTIONS 1 & 2: 0
2. FEELING DOWN, DEPRESSED OR HOPELESS: NOT AT ALL
1. LITTLE INTEREST OR PLEASURE IN DOING THINGS: NOT AT ALL

## 2024-02-07 NOTE — PROGRESS NOTES
Nicki Velásquez is a 33 y.o. male on day 0 of admission presenting with Chest wall abscess.      DC Planning:  Went in and met with the pt, confirmed demographics.   No home going needs anticipated for this pt.  Sent information to [facility] for insurance for this pt.       HU[employer] FLOR

## 2024-02-07 NOTE — ED TRIAGE NOTES
Pt BIBA c/c of cyst on chest, dizziness and anxiety. Cyst states had the cyst for 2 weeks has not been seen before for it. Pt stated having dizziness starting about 20 minutes ago. Pt heart rate is 150. MD notified, patient taken to critical room 3.     Pt A&Ox3, pt alert calm cooperative with care. Pt resp even and unlabored.     PMH: anxiety

## 2024-02-07 NOTE — BRIEF OP NOTE
Date: 2024  OR Location: [facility] OR    Name: Nicki Velásquez : 1990, Age: 33 y.o., MRN: [id number], Sex: male    Diagnosis  Pre-op Diagnosis     * Chest wall abscess [L02.213] Post-op Diagnosis     * Chest wall abscess [L02.213]     Procedures  Incision and Drainage Anterior Chest Abscess  66647 - UT INCISION & DRAINAGE ABSCESS COMPLICATED/MULTIPLE      Surgeons      * Madison Haro - Primary    Resident/Fellow/Other Assistant:  Surgeon(s) and Role:     * Joshua Shin MD - Resident - Assisting     * Eden Means MD - Resident - Assisting    Procedure Summary  Anesthesia: * No anesthesia type entered *  ASA: II  Anesthesia Staff: Anesthesiologist: Terry Howard MD  Anesthesia Resident: Iman Brunson DO  Estimated Blood Loss: 25mL  Intra-op Medications: Administrations occurring from 0045 to 0135 on 24:  * No intraprocedure medications in log *           Anesthesia Record               Intraprocedure I/O Totals          Intake    LR bolus 1000.00 mL    Propofol Drip 0.00 mL    The total shown is the total volume documented since Anesthesia Start was filed.    Total Intake 1000 mL       Output    Est. Blood Loss 25 mL    Total Output 25 mL       Net    Net Volume 975 mL          Specimen:   ID Type Source Tests Collected by Time   A : CHEST ABSCESS #1 Swab ABSCESS TISSUE/WOUND CULTURE/SMEAR Joshua Shin MD 2024 0133   B : CHEST ABSCESS #2 Swab ABSCESS TISSUE/WOUND CULTURE/SMEAR Joshua Shin MD 2024 0147   C : ABSCESS TISSUE Tissue ABSCESS TISSUE/WOUND CULTURE/SMEAR Joshua Shin MD 2024 0155        Staff:   Circulator: Romi Servin RN; Ji Medina RN  Scrub Person: Casey Ludwig          Findings: Right sided chest wall abscess that tracks from the third rib superiorly along the internal jugular vein with purulent drainage some loculations and necrotic tissue. 2 sets of wound cultures and 1 tissue culture were obtained. 2 penrose drains were placed. Abscess cavity was  packed with nu gauze, covered with 2 ABD pads and secured with pressure dressing.     Complications:  None; patient tolerated the procedure well.     Disposition: PACU - hemodynamically stable.  Condition: stable  Specimens Collected:   ID Type Source Tests Collected by Time   A : CHEST ABSCESS #1 Swab ABSCESS TISSUE/WOUND CULTURE/SMEAR Joshua Shin MD 2/7/2024 0133   B : CHEST ABSCESS #2 Swab ABSCESS TISSUE/WOUND CULTURE/SMEAR Joshua Shin MD 2/7/2024 0147   C : ABSCESS TISSUE Tissue ABSCESS TISSUE/WOUND CULTURE/SMEAR Joshua Shin MD 2/7/2024 0155     Attending Attestation: I was present for the entire procedure.    Madison Haro  Phone Number: [phone]

## 2024-02-07 NOTE — ED PROVIDER NOTES
HPI   Chief Complaint   Patient presents with    Dizziness       Patient is a 33-year-old male with past medical history of anxiety here with worsening swelling and pain to his right upper chest and neck.  He notes that he had a small amount of swelling 2 weeks ago, which progressed over the past 3 days to include his neck region and upper chest.  He also notes dizziness fevers and chills with the symptoms, but denies shortness of breath, pleurisy, stridor, dysphagia or dysphonia or posterior neck pain or anginal symptoms.  He also denies any objective fevers, IV drug use, recent dental procedures.      History provided by:  Patient and medical records   used: No                        [place] Coma Scale Score: 15                     Patient History   No past medical history on file.  No past surgical history on file.  No family history on file.  Social History     Tobacco Use    Smoking status: Not on file    Smokeless tobacco: Not on file   Substance Use Topics    Alcohol use: Not on file    Drug use: Not on file       Physical Exam   ED Triage Vitals   Temperature Heart Rate Respirations BP   02/06/24 2007 02/06/24 2007 02/06/24 2007 02/06/24 2007   36.3 °C (97.3 °F) (!) 150 18 155/89      Pulse Ox Temp Source Heart Rate Source Patient Position   02/06/24 2007 02/06/24 2007 02/06/24 2227 --   100 % Oral Monitor       BP Location FiO2 (%)     -- 02/06/24 2227      21 %       Physical Exam  Constitutional:       Appearance: Normal appearance. He is diaphoretic.   HENT:      Head: Normocephalic and atraumatic.      Right Ear: External ear normal.      Left Ear: External ear normal.      Nose: Nose normal.      Mouth/Throat:      Mouth: Mucous membranes are moist.      Pharynx: Oropharynx is clear.   Eyes:      Extraocular Movements: Extraocular movements intact.      Conjunctiva/sclera: Conjunctivae normal.      Pupils: Pupils are equal, round, and reactive to light.   Cardiovascular:      Rate  and Rhythm: Regular rhythm. Tachycardia present.      Pulses: Normal pulses.      Heart sounds: Normal heart sounds.   Pulmonary:      Effort: Pulmonary effort is normal. No respiratory distress.      Breath sounds: Normal breath sounds.      Comments: 20 cm x 10 cm fluctuant mass to the right upper chest extending to the anterior lateral right neck.  Abdominal:      Palpations: Abdomen is soft.      Tenderness: There is no abdominal tenderness.   Musculoskeletal:         General: Normal range of motion.      Cervical back: Normal range of motion and neck supple.      Right lower leg: No edema.      Left lower leg: No edema.   Skin:     General: Skin is warm.      Capillary Refill: Capillary refill takes less than 2 seconds.   Neurological:      General: No focal deficit present.      Mental Status: He is alert and oriented to person, place, and time.   Psychiatric:         Mood and Affect: Mood normal.         Behavior: Behavior normal.         ED Course & MDM        Medical Decision Making  33-year-old male here with worsening chest and neck swelling.  Presents tachycardic otherwise hemodynamically stable, no acute distress, mentating properly, afebrile and saturating well on room air.  Patient does look ill appearing and diaphoretic, and has a very large mass to the right upper chest and neck, while he denies IV drug use, recent dental procedures, endocarditis or mediastinitis are still in the differential.  He is given lactated Ringer's, started empirically on vancomycin and Zosyn after blood cultures obtained, and was sent for CT scans of the affected area to further delineate the injury.  It does apparently look like a potential folliculitis that has worsened to now become an abscess, no crepitus underlying, and patient is hemodynamically stable so no need for an empiric ACS consult for necrotizing infection.  He is also on room air.  Lemierre syndrome is also on the differential, but he is protecting his  airway well and there is no submandibular swelling concerning for Raulito's angina.  Bedside ultrasound did not disclose an obvious internal jugular vein clot.  Patient was signed out to oncoming provider pending CT results, I suspect he will need a surgical consult given the skin findings, and definite admission given his new leukocytosis, and markedly elevated ESR and CRP.  Signed out in stable condition.    Amount and/or Complexity of Data Reviewed  Labs: ordered.  Radiology: ordered and independent interpretation performed.  ECG/medicine tests: ordered and independent interpretation performed.     Details: Sinus tachycardia, no ST changes, normal axis    Risk  Decision regarding hospitalization.        Procedure  Procedures     Reynaldo Hyatt MD  Resident  02/06/24 8562

## 2024-02-07 NOTE — CARE PLAN
The patient's goals for the shift include to get some rest    The clinical goals for the shift include pain control      Problem: Skin  Goal: Decreased wound size/increased tissue granulation at next dressing change  Outcome: Progressing  Goal: Participates in plan/prevention/treatment measures  Outcome: Progressing  Goal: Prevent/manage excess moisture  Outcome: Progressing  Goal: Prevent/minimize sheer/friction injuries  Outcome: Progressing  Goal: Promote/optimize nutrition  Outcome: Progressing  Goal: Promote skin healing  Outcome: Progressing     Problem: Pain  Goal: My pain/discomfort is manageable  Outcome: Progressing     Problem: Daily Care  Goal: Daily care needs are met  Outcome: Progressing     Problem: Psychosocial Needs  Goal: Demonstrates ability to cope with hospitalization/illness  Outcome: Progressing  Goal: Collaborate with me, my family, and caregiver to identify my specific goals  Outcome: Progressing

## 2024-02-07 NOTE — ANESTHESIA POSTPROCEDURE EVALUATION
Patient: Nicki Velásquez    Procedure Summary       Date: 02/07/24 Room / Location: [facility] OR 11 / [facility] [place]    Anesthesia Start: 0113 Anesthesia Stop: 0234    Procedure: Incision and Drainage Anterior Chest Abscess Diagnosis:       Chest wall abscess      (Chest wall abscess [L02.213])    Surgeons: Madison Haro MD Responsible Provider: Terry Howard MD    Anesthesia Type: general ASA Status: 2 - Emergent            Anesthesia Type: general    Vitals Value Taken Time   /57 02/07/24 0316   Temp 36.9 °C (98.4 °F) 02/07/24 0231   Pulse 94 02/07/24 0319   Resp 19 02/07/24 0317   SpO2 96 % 02/07/24 0319   Vitals shown include unvalidated device data.    Anesthesia Post Evaluation    Patient location during evaluation: PACU  Patient participation: complete - patient participated  Level of consciousness: awake  Pain management: adequate  Airway patency: patent  Cardiovascular status: acceptable  Respiratory status: acceptable  Hydration status: acceptable  Postoperative Nausea and Vomiting: none        No notable events documented.
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
## 2024-02-07 NOTE — NURSING NOTE
Patient arrived at 0041 assisted to bed with 2 assist.  Patient complained of pain in right chest area with transfer.  Vital signs or assessment was not completed surgery came to get patient at 0046.

## 2024-02-07 NOTE — PROGRESS NOTES
[facility]  ACUTE CARE SURGERY - PROGRESS NOTE    Patient Name: Nicki Velásquez  MRN: [id number]  Admit Date: 206  : 1990  AGE: 33 y.o.   GENDER: male  ==============================================================================  TODAY'S ASSESSMENT AND PLAN OF CARE:  32 yo M with anxiety presenting with a large non-loculated chest wall abscess tracking to his right neck. Went to OR earlier this am on  for I&D of chest wall abscess and is now POD 0 s/p I&D chest wall abscess.     Neuro: h/o chronic anxiety   - Pain control with scheduled Tylenol 650 mg q6h,  Ibuprofen 600 mg q6h and PRN Oxy 5 mg q 6h   - Psychiatry consult- to see patient this afternoon for h/o uncontrolled anxiety. Appreciate recs.     Pulm:   - IS    Cardio:   - Monitor vitals q4hrs    GI:   - Regular diet  - Miralax daily     Renal/:   - Monitor electrolytes and replete as indicated  - Hypokalemia K+ 3.4; replenished     Endo: N/A     Heme:   - DVT ppx heparin 5000 U     ID: s/p I&D chest wall abscess on 24  - Monitor white count with daily CBC  - Cont Vanc and Zosyn   - Blood cultures : in progress  - Tissue culture : in progress  - Wound cultures x 2 : in progress     MSK:   - OOB    Dispo: Continue current level of care      Patient to be discussed with attending, Dr. Irving.     Plan preliminary until cosigned by attending physician.     Lucinda Bernstein MD   PGY1, Family Medicine   Acute Care Surgery Pager [phone]   ==============================================================================  CHIEF COMPLAINT / EVENTS LAST 24HRS / HPI:  Denies overnight events. He is feeling much better. He has some nausea.  He ate a little after surgery. Has been voiding without difficulty.  Denies fever, chills, and vomiting.    MEDICAL HISTORY / ROS:   Admission history and ROS reviewed. Pertinent changes as follows: None    PHYSICAL EXAM:  Heart Rate:  []   Temp:  [36.3 °C (97.3 °F)-37.3 °C  "(99.1 °F)]   Resp:  [5-23]   BP: ()/(50-96)   Height:  [175.3 cm (5' 9\")]   Weight:  [78 kg (172 lb)]   SpO2:  [96 %-100 %]   Physical Exam  Vitals and nursing note reviewed.   Constitutional:       Appearance: Normal appearance.   HENT:      Head: Normocephalic and atraumatic.   Eyes:      Conjunctiva/sclera: Conjunctivae normal.   Cardiovascular:      Rate and Rhythm: Normal rate and regular rhythm.   Pulmonary:      Effort: Pulmonary effort is normal. No respiratory distress.      Breath sounds: Normal breath sounds.   Abdominal:      General: Bowel sounds are normal.      Palpations: Abdomen is soft.      Tenderness: There is no abdominal tenderness.   Musculoskeletal:         General: No swelling. Normal range of motion.      Cervical back: Normal range of motion and neck supple.   Skin:     General: Skin is warm.      Comments: Dressing clean, dry and intact to R anterior chest. Non-tender to palpation.    Neurological:      General: No focal deficit present.      Mental Status: He is alert and oriented to person, place, and time. Mental status is at baseline.   Psychiatric:         Mood and Affect: Mood normal.         Behavior: Behavior normal.         Thought Content: Thought content normal.         Judgment: Judgment normal.           LABS:  Results for orders placed or performed during the hospital encounter of 02/06/24 (from the past 24 hour(s))   Blood Gas Venous Full Panel Unsolicited   Result Value Ref Range    POCT pH, Venous 7.43 7.33 - 7.43 pH    POCT pCO2, Venous 44 41 - 51 mm Hg    POCT pO2, Venous 35 35 - 45 mm Hg    POCT SO2, Venous 47 45 - 75 %    POCT Oxy Hemoglobin, Venous 46.4 45.0 - 75.0 %    POCT Hematocrit Calculated, Venous 33.0 (L) 41.0 - 52.0 %    POCT Sodium, Venous 132 (L) 136 - 145 mmol/L    POCT Potassium, Venous 4.4 3.5 - 5.3 mmol/L    POCT Chloride, Venous 97 (L) 98 - 107 mmol/L    POCT Ionized Calicum, Venous 1.15 1.10 - 1.33 mmol/L    POCT Glucose, Venous 126 (H) 74 - 99 " mg/dL    POCT Lactate, Venous 2.0 0.4 - 2.0 mmol/L    POCT Base Excess, Venous 4.3 (H) -2.0 - 3.0 mmol/L    POCT HCO3 Calculated, Venous 29.2 (H) 22.0 - 26.0 mmol/L    POCT Hemoglobin, Venous 10.9 (L) 13.5 - 17.5 g/dL    POCT Anion Gap, Venous 10.0 10.0 - 25.0 mmol/L    Patient Temperature 37.0 degrees Celsius   Blood Culture    Specimen: Peripheral Venipuncture; Blood culture   Result Value Ref Range    Blood Culture Loaded on Instrument - Culture in progress    Protime-INR   Result Value Ref Range    Protime 19.9 (H) 9.8 - 12.8 seconds    INR 1.8 (H) 0.9 - 1.1   Type and screen   Result Value Ref Range    ABO TYPE A     Rh TYPE POS     ANTIBODY SCREEN NEG    Blood Culture    Specimen: Peripheral Venipuncture; Blood culture   Result Value Ref Range    Blood Culture Loaded on Instrument - Culture in progress    Fibrinogen   Result Value Ref Range    Fibrinogen 805 (HH) 200 - 400 mg/dL   CBC and Auto Differential   Result Value Ref Range    WBC 26.8 (H) 4.4 - 11.3 x10*3/uL    nRBC 0.0 0.0 - 0.0 /100 WBCs    RBC 4.05 (L) 4.50 - 5.90 x10*6/uL    Hemoglobin 9.3 (L) 13.5 - 17.5 g/dL    Hematocrit 27.1 (L) 41.0 - 52.0 %    MCV 67 (L) 80 - 100 fL    MCH 23.0 (L) 26.0 - 34.0 pg    MCHC 34.3 32.0 - 36.0 g/dL    RDW 15.0 (H) 11.5 - 14.5 %    Platelets 759 (H) 150 - 450 x10*3/uL    Neutrophils % 75.2 40.0 - 80.0 %    Immature Granulocytes %, Automated 0.8 0.0 - 0.9 %    Lymphocytes % 16.5 13.0 - 44.0 %    Monocytes % 6.6 2.0 - 10.0 %    Eosinophils % 0.5 0.0 - 6.0 %    Basophils % 0.4 0.0 - 2.0 %    Neutrophils Absolute 20.16 (H) 1.20 - 7.70 x10*3/uL    Immature Granulocytes Absolute, Automated 0.21 0.00 - 0.70 x10*3/uL    Lymphocytes Absolute 4.44 1.20 - 4.80 x10*3/uL    Monocytes Absolute 1.78 (H) 0.10 - 1.00 x10*3/uL    Eosinophils Absolute 0.14 0.00 - 0.70 x10*3/uL    Basophils Absolute 0.10 0.00 - 0.10 x10*3/uL   Comprehensive metabolic panel   Result Value Ref Range    Glucose 107 (H) 74 - 99 mg/dL    Sodium 134 (L) 136  - 145 mmol/L    Potassium 4.2 3.5 - 5.3 mmol/L    Chloride 92 (L) 98 - 107 mmol/L    Bicarbonate 25 21 - 32 mmol/L    Anion Gap 21 (H) 10 - 20 mmol/L    Urea Nitrogen 7 6 - 23 mg/dL    Creatinine 0.67 0.50 - 1.30 mg/dL    eGFR >90 >60 mL/min/1.73m*2    Calcium 9.1 8.6 - 10.6 mg/dL    Albumin 3.5 3.4 - 5.0 g/dL    Alkaline Phosphatase 102 33 - 120 U/L    Total Protein 8.7 (H) 6.4 - 8.2 g/dL    AST 21 9 - 39 U/L    Bilirubin, Total 0.4 0.0 - 1.2 mg/dL    ALT 13 10 - 52 U/L   Magnesium   Result Value Ref Range    Magnesium 2.18 1.60 - 2.40 mg/dL   Sedimentation rate, automated   Result Value Ref Range    Sedimentation Rate >130 (H) 0 - 15 mm/h   C-reactive protein   Result Value Ref Range    C-Reactive Protein 24.55 (H) <1.00 mg/dL   Reticulocytes   Result Value Ref Range    Retic % 1.4 0.5 - 2.0 %    Retic Absolute 0.055 0.022 - 0.118 x10*6/uL    Reticulocyte Hemoglobin 27 (L) 28 - 38 pg    Immature Retic fraction 15.3 <=16.0 %   Creatinine, Serum   Result Value Ref Range    Creatinine 0.69 0.50 - 1.30 mg/dL    eGFR >90 >60 mL/min/1.73m*2   LDH, Lactate dehydrogenase   Result Value Ref Range     84 - 246 U/L   C-reactive protein   Result Value Ref Range    C-Reactive Protein 17.73 (H) <1.00 mg/dL   CBC and Auto Differential   Result Value Ref Range    WBC 18.4 (H) 4.4 - 11.3 x10*3/uL    nRBC 0.0 0.0 - 0.0 /100 WBCs    RBC 3.70 (L) 4.50 - 5.90 x10*6/uL    Hemoglobin 8.5 (L) 13.5 - 17.5 g/dL    Hematocrit 26.1 (L) 41.0 - 52.0 %    MCV 71 (L) 80 - 100 fL    MCH 23.0 (L) 26.0 - 34.0 pg    MCHC 32.6 32.0 - 36.0 g/dL    RDW 15.7 (H) 11.5 - 14.5 %    Platelets 605 (H) 150 - 450 x10*3/uL    Neutrophils % 83.7 40.0 - 80.0 %    Immature Granulocytes %, Automated 0.9 0.0 - 0.9 %    Lymphocytes % 8.9 13.0 - 44.0 %    Monocytes % 5.6 2.0 - 10.0 %    Eosinophils % 0.5 0.0 - 6.0 %    Basophils % 0.4 0.0 - 2.0 %    Neutrophils Absolute 15.40 (H) 1.20 - 7.70 x10*3/uL    Immature Granulocytes Absolute, Automated 0.17 0.00 - 0.70  x10*3/uL    Lymphocytes Absolute 1.63 1.20 - 4.80 x10*3/uL    Monocytes Absolute 1.03 (H) 0.10 - 1.00 x10*3/uL    Eosinophils Absolute 0.09 0.00 - 0.70 x10*3/uL    Basophils Absolute 0.07 0.00 - 0.10 x10*3/uL   Basic metabolic panel   Result Value Ref Range    Glucose 127 (H) 74 - 99 mg/dL    Sodium 136 136 - 145 mmol/L    Potassium 3.4 (L) 3.5 - 5.3 mmol/L    Chloride 99 98 - 107 mmol/L    Bicarbonate 30 21 - 32 mmol/L    Anion Gap 10 10 - 20 mmol/L    Urea Nitrogen 5 (L) 6 - 23 mg/dL    Creatinine 0.67 0.50 - 1.30 mg/dL    eGFR >90 >60 mL/min/1.73m*2    Calcium 8.8 8.6 - 10.6 mg/dL   Magnesium   Result Value Ref Range    Magnesium 2.16 1.60 - 2.40 mg/dL     MEDICATIONS:  Current Facility-Administered Medications   Medication Dose Route Frequency Provider Last Rate Last Admin    acetaminophen (Tylenol) tablet 650 mg  650 mg oral q6h Joshua Shin MD   650 mg at 02/07/24 0403    heparin (porcine) injection 5,000 Units  5,000 Units subcutaneous q8h Eden Means MD        ibuprofen tablet 600 mg  600 mg oral q6h Joshua Shin MD   600 mg at 02/07/24 0403    lactated Ringer's infusion  50 mL/hr intravenous Continuous Joshua Shin MD 50 mL/hr at 02/07/24 0426 50 mL/hr at 02/07/24 0426    lidocaine (Xylocaine) 10 mg/mL (1 %) injection 100 mg  10 mL subcutaneous Once Reynaldo Hyatt MD        oxyCODONE (Roxicodone) immediate release tablet 5 mg  5 mg oral q6h PRN Joshua Shin MD        piperacillin-tazobactam-dextrose (Zosyn) IV 3.375 g  3.375 g intravenous q6h Eden Means MD   Stopped at 02/07/24 0336    polyethylene glycol (Glycolax, Miralax) packet 17 g  17 g oral Daily Joshua Shin MD        potassium chloride CR (Klor-Con M20) ER tablet 40 mEq  40 mEq oral Once Yuriy Hogan, APRN-CNP        vancomycin (Vancocin) in 0.9 % sodium chloride 250 mL IV 1,250 mg  1,250 mg intravenous q12h Eden Means MD           IMAGING SUMMARY:  (summary of new imaging findings, not a copy of dictation)    I have reviewed all  laboratory and imaging results ordered/pertinent for today's encounter.

## 2024-02-07 NOTE — H&P
[facility]  ACUTE CARE SURGERY - HISTORY AND PHYSICAL    Patient Name: Nicki Velásquez MRN: [id number]  Admit Date: 206  : 1990  AGE: 33 y.o.   GENDER: male    ==============================================================================  ASSESSMENT AND PLAN:  34 yo M with anxiety presenting with a large non-loculated chest wall abscess tracking to his right neck.    - Admit to OR for I&D  - tylenol and motrin scheduled; prn oxycodone  - NPO for OR, IVF; anticipate regular diet and HLIV after OR  - vancomycin and zosyn for now  - f/u intra-op cultures  - Psychiatry consult in AM for debilitating anxiety, coping strategies, follow up  - SCDs; ambulate as able; add DVT ppx post-op    D/w and seen Dr. Estrellita Shin MD  General Surgery PGY4  ACS [id number]    ==============================================================================    CHIEF COMPLAINT:  Chest wall abscess    HISTORY OF PRESENT ILLNESS:  34 yo M with anxiety presenting to ED with a large chest wall abscess tracking to his right neck. Pt was shaving his chest with clippers ~two weeks ago when he noticed erythema that worsened and developed into a fluid collection. Was anxious about seeking medical care, and describes his anxiety as crippling. He is an  and has been unable to carry out his professional duties due to the discomfort and location of the abscess. Reports subjective fevers and chills. In ED, tachycardic to 150s, improved with IVF. WBC 26.8 with expected elevated CRP. [facility] consulted for evaluation.    PAST MEDICAL HISTORY:  Anxiety    PAST SURGICAL HISTORY:  Denies    PAST SOCIAL HISTORY:  Denies tobacco use  Denies EtOH use  Denies use of illicit or recreational drugs    PAST FAMILY HISTORY:  Denies    HOME MEDICATIONS:  Denies    ALLERGIES  No Known Allergies    REVIEW OF SYSTEMS:  14-point ROS performed; negative unless otherwise indicated in  HPI.    ==============================================================================    VITALS:  Vitals:    02/06/24 2300   BP: 124/73   Pulse: (!) 116   Resp: 16   Temp:    SpO2: 98%       PHYSICAL EXAM:  Gen:  NAD but anxious  HEENT:  Atraumatic, normocephalic  Eyes:  No scleral icterus  Card:  RRR  Resp:  Non-labored breathing on RA    Large superficial fluid collection extending from mid-chest up toward R lateral neck with surrounding erythema and streaking toward his R axilla  Abd:  Soft, non-tender, non-distended  Ext:  WWP, no swelling of BLE  MSK:  MARCUS  Neuro:  AOx3, no gross neurological deficits  Psych:  Appropriate mood and affect    IMAGING SUMMARY:  CT Chest 2/6: large fluid collection overlying chest wall and extending to R neck    LABS:  Results for orders placed or performed during the hospital encounter of 02/06/24 (from the past 24 hour(s))   Blood Gas Venous Full Panel Unsolicited   Result Value Ref Range    POCT pH, Venous 7.43 7.33 - 7.43 pH    POCT pCO2, Venous 44 41 - 51 mm Hg    POCT pO2, Venous 35 35 - 45 mm Hg    POCT SO2, Venous 47 45 - 75 %    POCT Oxy Hemoglobin, Venous 46.4 45.0 - 75.0 %    POCT Hematocrit Calculated, Venous 33.0 (L) 41.0 - 52.0 %    POCT Sodium, Venous 132 (L) 136 - 145 mmol/L    POCT Potassium, Venous 4.4 3.5 - 5.3 mmol/L    POCT Chloride, Venous 97 (L) 98 - 107 mmol/L    POCT Ionized Calicum, Venous 1.15 1.10 - 1.33 mmol/L    POCT Glucose, Venous 126 (H) 74 - 99 mg/dL    POCT Lactate, Venous 2.0 0.4 - 2.0 mmol/L    POCT Base Excess, Venous 4.3 (H) -2.0 - 3.0 mmol/L    POCT HCO3 Calculated, Venous 29.2 (H) 22.0 - 26.0 mmol/L    POCT Hemoglobin, Venous 10.9 (L) 13.5 - 17.5 g/dL    POCT Anion Gap, Venous 10.0 10.0 - 25.0 mmol/L    Patient Temperature 37.0 degrees Celsius   Blood Culture    Specimen: Peripheral Venipuncture; Blood culture   Result Value Ref Range    Blood Culture Loaded on Instrument - Culture in progress    Protime-INR   Result Value Ref Range     Protime 19.9 (H) 9.8 - 12.8 seconds    INR 1.8 (H) 0.9 - 1.1   Type and screen   Result Value Ref Range    ABO TYPE A     Rh TYPE POS     ANTIBODY SCREEN NEG    Blood Culture    Specimen: Peripheral Venipuncture; Blood culture   Result Value Ref Range    Blood Culture Loaded on Instrument - Culture in progress    Fibrinogen   Result Value Ref Range    Fibrinogen 805 (HH) 200 - 400 mg/dL   CBC and Auto Differential   Result Value Ref Range    WBC 26.8 (H) 4.4 - 11.3 x10*3/uL    nRBC 0.0 0.0 - 0.0 /100 WBCs    RBC 4.05 (L) 4.50 - 5.90 x10*6/uL    Hemoglobin 9.3 (L) 13.5 - 17.5 g/dL    Hematocrit 27.1 (L) 41.0 - 52.0 %    MCV 67 (L) 80 - 100 fL    MCH 23.0 (L) 26.0 - 34.0 pg    MCHC 34.3 32.0 - 36.0 g/dL    RDW 15.0 (H) 11.5 - 14.5 %    Platelets 759 (H) 150 - 450 x10*3/uL    Neutrophils % 75.2 40.0 - 80.0 %    Immature Granulocytes %, Automated 0.8 0.0 - 0.9 %    Lymphocytes % 16.5 13.0 - 44.0 %    Monocytes % 6.6 2.0 - 10.0 %    Eosinophils % 0.5 0.0 - 6.0 %    Basophils % 0.4 0.0 - 2.0 %    Neutrophils Absolute 20.16 (H) 1.20 - 7.70 x10*3/uL    Immature Granulocytes Absolute, Automated 0.21 0.00 - 0.70 x10*3/uL    Lymphocytes Absolute 4.44 1.20 - 4.80 x10*3/uL    Monocytes Absolute 1.78 (H) 0.10 - 1.00 x10*3/uL    Eosinophils Absolute 0.14 0.00 - 0.70 x10*3/uL    Basophils Absolute 0.10 0.00 - 0.10 x10*3/uL   Comprehensive metabolic panel   Result Value Ref Range    Glucose 107 (H) 74 - 99 mg/dL    Sodium 134 (L) 136 - 145 mmol/L    Potassium 4.2 3.5 - 5.3 mmol/L    Chloride 92 (L) 98 - 107 mmol/L    Bicarbonate 25 21 - 32 mmol/L    Anion Gap 21 (H) 10 - 20 mmol/L    Urea Nitrogen 7 6 - 23 mg/dL    Creatinine 0.67 0.50 - 1.30 mg/dL    eGFR >90 >60 mL/min/1.73m*2    Calcium 9.1 8.6 - 10.6 mg/dL    Albumin 3.5 3.4 - 5.0 g/dL    Alkaline Phosphatase 102 33 - 120 U/L    Total Protein 8.7 (H) 6.4 - 8.2 g/dL    AST 21 9 - 39 U/L    Bilirubin, Total 0.4 0.0 - 1.2 mg/dL    ALT 13 10 - 52 U/L   Magnesium   Result Value Ref  Range    Magnesium 2.18 1.60 - 2.40 mg/dL   Sedimentation rate, automated   Result Value Ref Range    Sedimentation Rate >130 (H) 0 - 15 mm/h   C-reactive protein   Result Value Ref Range    C-Reactive Protein 24.55 (H) <1.00 mg/dL   Reticulocytes   Result Value Ref Range    Retic % 1.4 0.5 - 2.0 %    Retic Absolute 0.055 0.022 - 0.118 x10*6/uL    Reticulocyte Hemoglobin 27 (L) 28 - 38 pg    Immature Retic fraction 15.3 <=16.0 %   Creatinine, Serum   Result Value Ref Range    Creatinine 0.69 0.50 - 1.30 mg/dL    eGFR >90 >60 mL/min/1.73m*2   LDH, Lactate dehydrogenase   Result Value Ref Range     84 - 246 U/L       I have reviewed all laboratory and imaging results ordered/pertinent for this encounter.

## 2024-02-07 NOTE — PROGRESS NOTES
"Vancomycin Dosing by Pharmacy- INITIAL    Nicki Velásquez is a 33 y.o. year old male who Pharmacy has been consulted for vancomycin dosing for cellulitis, skin and soft tissue. Based on the patient's indication and renal status this patient will be dosed based on a goal AUC of 400-600.     Renal function is currently stable.    Visit Vitals  /57   Pulse 92   Temp 36.9 °C (98.4 °F) (Temporal)   Resp 19        Lab Results   Component Value Date    CREATININE 0.69 02/06/2024    CREATININE 0.67 02/06/2024        Patient weight is No results found for: \"PTWEIGHT\"    No results found for: \"CULTURE\"     No intake/output data recorded.  [unfilled]    Lab Results   Component Value Date    PATIENTTEMP 37.0 02/06/2024          Assessment/Plan     Patient has already been given a loading dose of 1500 mg.  Will initiate vancomycin maintenance,  1250 mg every 12 hours.    This dosing regimen is predicted by [employer] to result in the following pharmacokinetic parameters:  Loading dose: N/A  Regimen: 1250 mg IV every 12 hours.  Start time: 09:15 on 02/07/2024  Exposure target: AUC24 (range)400-600 mg/L.hr   AUC24,ss: 462 mg/L.hr  Probability of AUC24 > 400: 65 %  Ctrough,ss: 13.1 mg/L  Probability of Ctrough,ss > 20: 21 %  Probability of nephrotoxicity (Lodise TJ 2009): 8 %    Follow-up level will be ordered on 2/8 at AM labs unless clinically indicated sooner.  Will continue to monitor renal function daily while on vancomycin and order serum creatinine at least every 48 hours if not already ordered.  Follow for continued vancomycin needs, clinical response, and signs/symptoms of toxicity.       ERASTO CHO, PharmD       "

## 2024-02-07 NOTE — ANESTHESIA PREPROCEDURE EVALUATION
Patient: Nicki Velásquez    Procedure Information       Date/Time: 02/07/24 0045    Procedure: Incision and Drainage Chest    Location: [facility] OR 11 / [facility]    Surgeons: Madison Haro MD            Relevant Problems   Anesthesia (within normal limits)      Neuro/Psych   (+) Anxiety      Musculoskeletal  Chest wall absess       Clinical information reviewed:    Allergies                NPO Detail:  No data recorded     PHYSICAL EXAM    Anesthesia Plan    History of general anesthesia?: no  History of complications of general anesthesia?: no    ASA 2 - emergent     MAC     intravenous induction   Postoperative administration of opioids is intended.  Trial extubation is planned.  Anesthetic plan and risks discussed with patient.    Plan discussed with resident.

## 2024-02-08 ENCOUNTER — PHARMACY VISIT (OUTPATIENT)
Dept: PHARMACY | Facility: CLINIC | Age: 34
End: 2024-02-08
Payer: COMMERCIAL

## 2024-02-08 VITALS
DIASTOLIC BLOOD PRESSURE: 72 MMHG | RESPIRATION RATE: 18 BRPM | BODY MASS INDEX: 25.48 KG/M2 | WEIGHT: 172 LBS | SYSTOLIC BLOOD PRESSURE: 113 MMHG | OXYGEN SATURATION: 99 % | HEIGHT: 69 IN | TEMPERATURE: 98.4 F | HEART RATE: 62 BPM

## 2024-02-08 LAB
ALBUMIN SERPL BCP-MCNC: 2.6 G/DL (ref 3.4–5)
ANION GAP SERPL CALC-SCNC: 16 MMOL/L (ref 10–20)
BUN SERPL-MCNC: 5 MG/DL (ref 6–23)
CALCIUM SERPL-MCNC: 8.6 MG/DL (ref 8.6–10.6)
CHLORIDE SERPL-SCNC: 104 MMOL/L (ref 98–107)
CO2 SERPL-SCNC: 26 MMOL/L (ref 21–32)
CREAT SERPL-MCNC: 0.72 MG/DL (ref 0.5–1.3)
EGFRCR SERPLBLD CKD-EPI 2021: >90 ML/MIN/1.73M*2
ERYTHROCYTE [DISTWIDTH] IN BLOOD BY AUTOMATED COUNT: 16.6 % (ref 11.5–14.5)
GLUCOSE SERPL-MCNC: 80 MG/DL (ref 74–99)
HCT VFR BLD AUTO: 28.4 % (ref 41–52)
HGB BLD-MCNC: 8.9 G/DL (ref 13.5–17.5)
MAGNESIUM SERPL-MCNC: 2.26 MG/DL (ref 1.6–2.4)
MCH RBC QN AUTO: 22.9 PG (ref 26–34)
MCHC RBC AUTO-ENTMCNC: 31.3 G/DL (ref 32–36)
MCV RBC AUTO: 73 FL (ref 80–100)
NRBC BLD-RTO: 0 /100 WBCS (ref 0–0)
PHOSPHATE SERPL-MCNC: 4.4 MG/DL (ref 2.5–4.9)
PLATELET # BLD AUTO: 602 X10*3/UL (ref 150–450)
POTASSIUM SERPL-SCNC: 4.7 MMOL/L (ref 3.5–5.3)
RBC # BLD AUTO: 3.88 X10*6/UL (ref 4.5–5.9)
SODIUM SERPL-SCNC: 141 MMOL/L (ref 136–145)
VANCOMYCIN SERPL-MCNC: 7.6 UG/ML (ref 5–20)
WBC # BLD AUTO: 15.7 X10*3/UL (ref 4.4–11.3)

## 2024-02-08 PROCEDURE — 83735 ASSAY OF MAGNESIUM: CPT | Performed by: PODIATRIST

## 2024-02-08 PROCEDURE — 80202 ASSAY OF VANCOMYCIN: CPT

## 2024-02-08 PROCEDURE — 2500000004 HC RX 250 GENERAL PHARMACY W/ HCPCS (ALT 636 FOR OP/ED): Performed by: STUDENT IN AN ORGANIZED HEALTH CARE EDUCATION/TRAINING PROGRAM

## 2024-02-08 PROCEDURE — 2500000004 HC RX 250 GENERAL PHARMACY W/ HCPCS (ALT 636 FOR OP/ED)

## 2024-02-08 PROCEDURE — 2500000001 HC RX 250 WO HCPCS SELF ADMINISTERED DRUGS (ALT 637 FOR MEDICARE OP): Performed by: STUDENT IN AN ORGANIZED HEALTH CARE EDUCATION/TRAINING PROGRAM

## 2024-02-08 PROCEDURE — 2500000004 HC RX 250 GENERAL PHARMACY W/ HCPCS (ALT 636 FOR OP/ED): Performed by: NURSE PRACTITIONER

## 2024-02-08 PROCEDURE — RXMED WILLOW AMBULATORY MEDICATION CHARGE

## 2024-02-08 PROCEDURE — 36415 COLL VENOUS BLD VENIPUNCTURE: CPT | Performed by: PODIATRIST

## 2024-02-08 PROCEDURE — 85027 COMPLETE CBC AUTOMATED: CPT | Performed by: PODIATRIST

## 2024-02-08 PROCEDURE — 80069 RENAL FUNCTION PANEL: CPT | Performed by: PODIATRIST

## 2024-02-08 RX ORDER — ACETAMINOPHEN 325 MG/1
650 TABLET ORAL EVERY 6 HOURS PRN
Qty: 112 TABLET | Refills: 0 | Status: SHIPPED | OUTPATIENT
Start: 2024-02-08 | End: 2024-02-22

## 2024-02-08 RX ORDER — AMOXICILLIN AND CLAVULANATE POTASSIUM 875; 125 MG/1; MG/1
1 TABLET, FILM COATED ORAL 2 TIMES DAILY
Qty: 12 TABLET | Refills: 0 | Status: SHIPPED | OUTPATIENT
Start: 2024-02-08 | End: 2024-02-14

## 2024-02-08 RX ORDER — IBUPROFEN 600 MG/1
600 TABLET ORAL EVERY 6 HOURS PRN
Qty: 56 TABLET | Refills: 0 | Status: SHIPPED | OUTPATIENT
Start: 2024-02-08 | End: 2024-02-22

## 2024-02-08 RX ORDER — OXYCODONE HYDROCHLORIDE 5 MG/1
5 TABLET ORAL EVERY 6 HOURS PRN
Qty: 10 TABLET | Refills: 0 | Status: SHIPPED | OUTPATIENT
Start: 2024-02-08

## 2024-02-08 RX ADMIN — PIPERACILLIN SODIUM AND TAZOBACTAM SODIUM 3.38 G: 3; .375 INJECTION, SOLUTION INTRAVENOUS at 08:27

## 2024-02-08 RX ADMIN — POLYETHYLENE GLYCOL 3350 17 G: 17 POWDER, FOR SOLUTION ORAL at 08:30

## 2024-02-08 RX ADMIN — IBUPROFEN 600 MG: 600 TABLET, FILM COATED ORAL at 03:52

## 2024-02-08 RX ADMIN — KETOROLAC TROMETHAMINE 15 MG: 15 INJECTION, SOLUTION INTRAMUSCULAR; INTRAVENOUS at 06:08

## 2024-02-08 RX ADMIN — PIPERACILLIN SODIUM AND TAZOBACTAM SODIUM 3.38 G: 3; .375 INJECTION, SOLUTION INTRAVENOUS at 03:52

## 2024-02-08 RX ADMIN — ACETAMINOPHEN 650 MG: 325 TABLET ORAL at 03:52

## 2024-02-08 RX ADMIN — KETOROLAC TROMETHAMINE 15 MG: 15 INJECTION, SOLUTION INTRAMUSCULAR; INTRAVENOUS at 00:43

## 2024-02-08 RX ADMIN — ACETAMINOPHEN 650 MG: 325 TABLET ORAL at 08:28

## 2024-02-08 ASSESSMENT — PAIN - FUNCTIONAL ASSESSMENT
PAIN_FUNCTIONAL_ASSESSMENT: 0-10

## 2024-02-08 ASSESSMENT — PAIN DESCRIPTION - DESCRIPTORS: DESCRIPTORS: DISCOMFORT;SORE

## 2024-02-08 ASSESSMENT — COGNITIVE AND FUNCTIONAL STATUS - GENERAL
STANDING UP FROM CHAIR USING ARMS: A LITTLE
TURNING FROM BACK TO SIDE WHILE IN FLAT BAD: A LITTLE
MOVING FROM LYING ON BACK TO SITTING ON SIDE OF FLAT BED WITH BEDRAILS: A LITTLE
HELP NEEDED FOR BATHING: A LITTLE
DRESSING REGULAR LOWER BODY CLOTHING: A LITTLE
DAILY ACTIVITIY SCORE: 20
TOILETING: A LITTLE
CLIMB 3 TO 5 STEPS WITH RAILING: A LITTLE
MOVING TO AND FROM BED TO CHAIR: A LITTLE
WALKING IN HOSPITAL ROOM: A LITTLE
DRESSING REGULAR UPPER BODY CLOTHING: A LITTLE
MOBILITY SCORE: 18

## 2024-02-08 ASSESSMENT — PAIN SCALES - PAIN ASSESSMENT IN ADVANCED DEMENTIA (PAINAD): BREATHING: NORMAL

## 2024-02-08 ASSESSMENT — PAIN SCALES - GENERAL
PAINLEVEL_OUTOF10: 6
PAINLEVEL_OUTOF10: 0 - NO PAIN
PAINLEVEL_OUTOF10: 2

## 2024-02-08 ASSESSMENT — PAIN DESCRIPTION - ORIENTATION
ORIENTATION: RIGHT
ORIENTATION: RIGHT

## 2024-02-08 ASSESSMENT — PAIN DESCRIPTION - LOCATION
LOCATION: CHEST
LOCATION: CHEST

## 2024-02-08 NOTE — HOSPITAL COURSE
34 yo M with anxiety presenting to ED with a large chest wall abscess tracking to his right neck. Pt was shaving his chest with clippers ~two weeks ago when he noticed erythema that worsened and developed into a fluid collection. Patient was taken to the OR for incision and drainage as well as 2 penrose drain placements. Packing was removed POD1. Patient has a long history of anxiety and panic attacks and wanted to speak to psychiatry. Psychiatry saw the patient and recommended no acute intervention. They gave resources for outpatient follow up.  Patient remained hemodynamically stable and was medically ready for discharge with follow up appointments with ACS for seton removal and psychiatry for acute on chronic anxiety.

## 2024-02-08 NOTE — DISCHARGE SUMMARY
Discharge Diagnosis  Chest wall abscess    Issues Requiring Follow-Up  [ ] General surgery follow up in 2 weeks- follow up s/p I&D chest wall abscess DOS 2/7/24  [ ] Primary care-follow up post hospital discharge/establish primary care/ medication reconciliation     Test Results Pending At Discharge  Pending Labs       Order Current Status    Blood Culture Preliminary result    Blood Culture Preliminary result    Tissue/Wound Culture/Smear Preliminary result    Tissue/Wound Culture/Smear Preliminary result    Tissue/Wound Culture/Smear Preliminary result            Hospital Course  32 yo M with anxiety presenting to ED with a large chest wall abscess tracking to his right neck. Pt was shaving his chest with clippers ~two weeks ago when he noticed erythema that worsened and developed into a fluid collection. Patient was taken to the OR for incision and drainage as well as 2 penrose drain placements. Packing was removed POD1. Patient has a long history of anxiety and panic attacks and wanted to speak to psychiatry. Psychiatry saw the patient and recommended no acute intervention. They gave resources for outpatient follow up.  Patient remained hemodynamically stable and was medically ready for discharge with follow up appointments with [facility] for seton removal and psychiatry for acute on chronic anxiety.    Pertinent Physical Exam At Time of Discharge  Physical Exam  Vitals and nursing note reviewed.   Constitutional:       General: He is not in acute distress.     Appearance: Normal appearance.   HENT:      Head: Normocephalic.      Right Ear: Tympanic membrane normal.      Left Ear: Tympanic membrane normal.   Eyes:      Extraocular Movements: Extraocular movements intact.      Conjunctiva/sclera: Conjunctivae normal.   Cardiovascular:      Rate and Rhythm: Normal rate and regular rhythm.      Heart sounds: Normal heart sounds.   Pulmonary:      Effort: Pulmonary effort is normal.      Breath sounds: Normal breath  sounds.   Abdominal:      General: Bowel sounds are normal. There is no distension.      Palpations: Abdomen is soft.      Tenderness: There is no abdominal tenderness.   Musculoskeletal:         General: Normal range of motion.      Cervical back: Normal range of motion.   Lymphadenopathy:      Cervical: No cervical adenopathy.   Skin:     General: Skin is warm.      Comments: Seton drain in place. Small amount serosanguinous drainage. No purulence noted. No calor.    Neurological:      General: No focal deficit present.      Mental Status: He is alert.   Psychiatric:         Mood and Affect: Mood normal.         Behavior: Behavior normal.         Home Medications     Medication List      START taking these medications     acetaminophen 325 mg tablet; Commonly known as: Tylenol; Take 2 tablets   (650 mg) by mouth every 6 hours if needed for mild pain (1 - 3) for up to   14 days.   amoxicillin-pot clavulanate 875-125 mg tablet; Commonly known as:   Augmentin; Take 1 tablet by mouth 2 times a day for 6 days.   ibuprofen 600 mg tablet; Take 1 tablet (600 mg) by mouth every 6 hours   if needed for mild pain (1 - 3) for up to 14 days.   oxyCODONE 5 mg immediate release tablet; Commonly known as: Roxicodone;   Take 1 tablet (5 mg) by mouth every 6 hours if needed for severe pain (7 -   10) (Take for severe pain.) for up to 10 doses.       Outpatient Follow-Up  No future appointments.    Lucinda Bernstein MD

## 2024-02-08 NOTE — PROGRESS NOTES
Vancomycin Dosing by Pharmacy- FOLLOW UP    Nicki Velásquez is a 33 y.o. year old male who Pharmacy has been consulted for vancomycin dosing for cellulitis, skin and soft tissue. Based on the patient's indication and renal status this patient is being dosed based on a goal AUC of 400-600.     Renal function is currently stable.    Current vancomycin dose: 1250 mg given every 12 hours    Estimated vancomycin AUC on current dose: 324 mg/L.hr     Visit Vitals  /72 (BP Location: Right arm, Patient Position: Lying)   Pulse 62   Temp 36.9 °C (98.4 °F) (Temporal)   Resp 18        Lab Results   Component Value Date    CREATININE 0.72 02/08/2024    CREATININE 0.67 02/07/2024    CREATININE 0.69 02/06/2024    CREATININE 0.67 02/06/2024        Patient weight is 78 kg    I/O last 3 completed shifts:  In: 6600.8 (84.6 mL/kg) [P.O.:2740; I.V.:2460.8 (31.5 mL/kg); IV Piggyback:1400]  Out: 2935 (37.6 mL/kg) [Urine:2910 (1 mL/kg/hr); Blood:25]  Weight: 78 kg   [unfilled]    Lab Results   Component Value Date    PATIENTTEMP 37.0 02/06/2024        Assessment/Plan    Below goal AUC. Orders placed for new vancomcyin regimen of 2000 every 12 hours to begin at 0930.     This dosing regimen is predicted by [employer] to result in the following pharmacokinetic parameters:  Loading dose: N/A  Regimen: 2000 mg IV every 12 hours.  Start time: 09:11 on 02/08/2024  Exposure target: AUC24 (range)400-600 mg/L.hr   AUC24,ss: 518 mg/L.hr  Probability of AUC24 > 400: 94 %  Ctrough,ss: 10.7 mg/L  Probability of Ctrough,ss > 20: 0 %  Probability of nephrotoxicity (Lodise TJ 2009): 6 %    The next level will be obtained on 2/9 with AM labs. May be obtained sooner if clinically indicated.   Will continue to monitor renal function daily while on vancomycin and order serum creatinine at least every 48 hours if not already ordered.  Follow for continued vancomycin needs, clinical response, and signs/symptoms of toxicity.       Hari Rosado,  PharmD

## 2024-02-08 NOTE — DISCHARGE INSTRUCTIONS
Follow up instructions  -General Surgery will call you to make an appointment.  You should see them in 2 weeks.    -Primary Care will call you to make an appointment. You should see them in 1-2 weeks.     Discharge Medications  You have been sent home with the following home medications: Augmentin, Oxycodone, Tylenol and Ibuprofen.  Oxycodone is for severe pain. Please wean yourself off the oxycodone, as tolerated.You can alternate Tylenol with Ibuprofen every 6 hours. Augmentin is an antibiotic for your chest wound. Please complete the entire course of 6 days.

## 2024-02-08 NOTE — PROGRESS NOTES
Discharge Planning Note:      Nicki Velásquez is a 33 y.o. male on day 1 of admission presenting with Chest wall abscess.        Per HRS: Letter to [facility] pharmacy around 130 pm for Medicaid pending status for medications for home.         Brea Garcia RN TCC

## 2024-02-09 NOTE — PROGRESS NOTES
Procedure  Critical Care    Performed by: Ramandeep Champion MD  Authorized by: Madisyn Pittman DO    Critical care provider statement:     Critical care time (minutes):  30    Critical care was necessary to treat or prevent imminent or life-threatening deterioration of the following conditions:  Sepsis    Critical care was time spent personally by me on the following activities:  Discussions with consultants, evaluation of patient's response to treatment, examination of patient, re-evaluation of patient's condition, ordering and review of radiographic studies and ordering and review of laboratory studies

## 2024-02-10 ENCOUNTER — HOSPITAL ENCOUNTER (EMERGENCY)
Facility: HOSPITAL | Age: 34
Discharge: HOME | End: 2024-02-10
Attending: EMERGENCY MEDICINE
Payer: MEDICAID

## 2024-02-10 VITALS
DIASTOLIC BLOOD PRESSURE: 74 MMHG | OXYGEN SATURATION: 100 % | BODY MASS INDEX: 25.18 KG/M2 | RESPIRATION RATE: 18 BRPM | HEART RATE: 77 BPM | TEMPERATURE: 97.9 F | WEIGHT: 170 LBS | SYSTOLIC BLOOD PRESSURE: 119 MMHG | HEIGHT: 69 IN

## 2024-02-10 DIAGNOSIS — Z48.89 ENCOUNTER FOR POST SURGICAL WOUND CHECK: Primary | ICD-10-CM

## 2024-02-10 LAB
BACTERIA BLD CULT: NORMAL
BACTERIA BLD CULT: NORMAL
BACTERIA SPEC CULT: ABNORMAL
BACTERIA SPEC CULT: ABNORMAL
BASOPHILS # BLD AUTO: 0.1 X10*3/UL (ref 0–0.1)
BASOPHILS NFR BLD AUTO: 0.6 %
EOSINOPHIL # BLD AUTO: 0.3 X10*3/UL (ref 0–0.7)
EOSINOPHIL NFR BLD AUTO: 1.7 %
ERYTHROCYTE [DISTWIDTH] IN BLOOD BY AUTOMATED COUNT: 16.4 % (ref 11.5–14.5)
GRAM STN SPEC: ABNORMAL
HCT VFR BLD AUTO: 28.7 % (ref 41–52)
HGB BLD-MCNC: 9.5 G/DL (ref 13.5–17.5)
IMM GRANULOCYTES # BLD AUTO: 0.23 X10*3/UL (ref 0–0.7)
IMM GRANULOCYTES NFR BLD AUTO: 1.3 % (ref 0–0.9)
LYMPHOCYTES # BLD AUTO: 2.5 X10*3/UL (ref 1.2–4.8)
LYMPHOCYTES NFR BLD AUTO: 14 %
MCH RBC QN AUTO: 23.2 PG (ref 26–34)
MCHC RBC AUTO-ENTMCNC: 33.1 G/DL (ref 32–36)
MCV RBC AUTO: 70 FL (ref 80–100)
MONOCYTES # BLD AUTO: 0.88 X10*3/UL (ref 0.1–1)
MONOCYTES NFR BLD AUTO: 4.9 %
NEUTROPHILS # BLD AUTO: 13.86 X10*3/UL (ref 1.2–7.7)
NEUTROPHILS NFR BLD AUTO: 77.5 %
NRBC BLD-RTO: 0 /100 WBCS (ref 0–0)
PLATELET # BLD AUTO: 777 X10*3/UL (ref 150–450)
RBC # BLD AUTO: 4.09 X10*6/UL (ref 4.5–5.9)
WBC # BLD AUTO: 17.9 X10*3/UL (ref 4.4–11.3)

## 2024-02-10 PROCEDURE — 99284 EMERGENCY DEPT VISIT MOD MDM: CPT | Performed by: EMERGENCY MEDICINE

## 2024-02-10 PROCEDURE — 99284 EMERGENCY DEPT VISIT MOD MDM: CPT | Performed by: NURSE PRACTITIONER

## 2024-02-10 PROCEDURE — 85025 COMPLETE CBC W/AUTO DIFF WBC: CPT | Performed by: NURSE PRACTITIONER

## 2024-02-10 PROCEDURE — 99283 EMERGENCY DEPT VISIT LOW MDM: CPT

## 2024-02-10 PROCEDURE — 36415 COLL VENOUS BLD VENIPUNCTURE: CPT | Performed by: NURSE PRACTITIONER

## 2024-02-10 ASSESSMENT — COLUMBIA-SUICIDE SEVERITY RATING SCALE - C-SSRS
6. HAVE YOU EVER DONE ANYTHING, STARTED TO DO ANYTHING, OR PREPARED TO DO ANYTHING TO END YOUR LIFE?: NO
1. IN THE PAST MONTH, HAVE YOU WISHED YOU WERE DEAD OR WISHED YOU COULD GO TO SLEEP AND NOT WAKE UP?: NO
2. HAVE YOU ACTUALLY HAD ANY THOUGHTS OF KILLING YOURSELF?: NO

## 2024-02-10 NOTE — ED PROVIDER NOTES
HPI   Chief Complaint   Patient presents with   • Wound Check       A 33-year-old male with a history significant for depression/anxiety who underwent an Incision and Drainage of right chest abscess tracking to right neck by [facility] on 2/7/2024 presents ambulatory to the emergency department for a wound check.  States that he is concerned due to persistent bloody drainage and increasing pain at the site. In addition, he is worried that the drain may be falling out. He is taking Augmentin and Ibuprofen. Denies any associated fever, chills, malaise, sore throat, dysphagia, chest pain, shortness of breath, palpitations, cough, abdominal pain, nausea, vomiting, change in stool caliber or frequency.    Review of systems: A review of systems was performed with pertinent positives and negatives as per HPI    Social history: Non-smoker.  Denies alcohol or drug use.    Family history: Noncontributory    Medical history: Depression/anxiety    Surgical history: Incision and drainage of right anterior chest abscess    Medications: OxyContin, ibuprofen, Augmentin    Allergies: No known drug allergies                          [place] Coma Scale Score: 15                     Patient History   No past medical history on file.  No past surgical history on file.  No family history on file.  Social History     Tobacco Use   • Smoking status: Never   • Smokeless tobacco: Never   Vaping Use   • Vaping Use: Never used   Substance Use Topics   • Alcohol use: Yes     Alcohol/week: 1.0 standard drink of alcohol     Types: 1 Glasses of wine per week   • Drug use: Never       Physical Exam   ED Triage Vitals [02/10/24 1250]   Temperature Heart Rate Respirations BP   36.6 °C (97.9 °F) 68 16 117/71      Pulse Ox Temp Source Heart Rate Source Patient Position   100 % Temporal -- Sitting      BP Location FiO2 (%)     Left arm --       Physical Exam  Vitals reviewed.   Constitutional:       Appearance: Normal appearance.   HENT:      Head:  Normocephalic and atraumatic.      Right Ear: External ear normal.      Left Ear: External ear normal.      Nose: Nose normal. No congestion.      Mouth/Throat:      Mouth: Mucous membranes are moist.      Pharynx: Oropharynx is clear.   Eyes:      Extraocular Movements: Extraocular movements intact.      Conjunctiva/sclera: Conjunctivae normal.      Pupils: Pupils are equal, round, and reactive to light.   Neck:      Comments: Positive left lateral neck swelling with drainage of sanguinous fluid.  Right lateral neck with open wound with penrose drain in place   Cardiovascular:      Rate and Rhythm: Normal rate and regular rhythm.      Pulses: Normal pulses.   Pulmonary:      Effort: Pulmonary effort is normal. No respiratory distress.      Breath sounds: Normal breath sounds.   Chest:      Comments: Right upper anterior chest wound with penrose drain in place.  Positive for sanguinous drainage.   Mild surrounding erythema   Abdominal:      General: Bowel sounds are normal. There is no distension.      Palpations: Abdomen is soft.      Tenderness: There is no abdominal tenderness.   Musculoskeletal:         General: Normal range of motion.      Cervical back: Normal range of motion.   Skin:     General: Skin is warm and dry.      Capillary Refill: Capillary refill takes less than 2 seconds.   Neurological:      General: No focal deficit present.      Mental Status: He is alert and oriented to person, place, and time.   Psychiatric:         Mood and Affect: Mood normal.         Behavior: Behavior normal.         ED Course & MDM   Diagnoses as of 02/10/24 1638   Encounter for post surgical wound check       Medical Decision Making  A physical examination and interview was conducted with a 33-year-old male who is status post an incision and drainage of a right chest abscess on 2/7/2024 who presents to the emergency department for a wound check.  He verbalizes concern for the persistent bloody drainage, pain and feeling  like the drain may fall out.  He is taking his antibiotic regularly.  Denies need for any pain medication at this time.  Vital signs reviewed he is afebrile and hemodynamically stable.  I discussed the case with acute care surgery who did perform the procedure.  They recommend drawing a CBC.  They will be down to evaluate the patient.  After speaking with his attending, [facility] recommends that the wound be evaluated by ED personnel. I did relay to him that patient does have increased tenderness, intact draining penrose, granulating tissue with mild localized inflammation. CBC (WBC 17.9 h/h 9.t/28.7).   I staffed the patient with ED attending, Dr. Duffy who evaluates the patient and recommends that [facility] re-evaluate the patient.   I spoke again with Dr. Sarath Pereyra ([facility]) who denies need to see the patient. He recommends applying a new dressing and referring the patient to scheduling to make a follow-up appointment.  He will speak with his attending and call back.   [facility] evaluated the patient. He answered all of the patient's questions and concerns. New dressing applied. General wound care provided.   Patient to follow up at Surgery Clinic in 10 days. Provided with phone number for any further concerns.  Advised to return to the emergency room as needed for worsening symptoms or concerns.         Procedure  Procedures     Monique Amaral, MILVIA-HUDSON  02/10/24 6551

## 2024-02-10 NOTE — ED TRIAGE NOTES
Pt reports he had a right chest wall abscess drained and was discharged from the hospital on 2/7. Today pt is concerned about the drains and if they are where they are supposed to be. Pt also still has pain. Wound is not red or warm but there is still bloody drainage .

## 2024-02-10 NOTE — SIGNIFICANT EVENT
Postop wound check.      Pt presented to ED for questions about his wound. He is taking his augmentin and is changing the ABD dressing overlying the penrose x3, twice a day. Denies fevers, chills. Vitals normal. Image uploaded to chart.     Pt and mother educated about wound and red flag signs and we discussed showering is encouraged. Let water run over incision and pat dry. We will evaluate his wound 2 weeks from his 2/7/24 I&D in a post-op clinic visit. Phone number for [facility] clinic and phone number for wound questions were given to pt and his mother.     Discussed with Attending, Dr. Maxwell.    Sarath Pereyra MD

## 2024-02-10 NOTE — DISCHARGE INSTRUCTIONS
You may shower-do not wash area.  Change dressing daily and as needed.  Continue taking the antibiotic.  Follow up with surgery clinic in 10 days (call to confirm appointment).   Return to emergency room as needed.

## 2024-02-11 NOTE — OP NOTE
Incision and Drainage Anterior Chest Abscess Operative Note     Date: 2024  OR Location: [facility] OR    Name: Nicki Velásquez : 1990, Age: 33 y.o., MRN: [id number], Sex: male    Diagnosis  Pre-op Diagnosis     * Chest wall abscess [L02.213] Post-op Diagnosis     * Chest wall abscess [L02.213]     Procedures  Incision and Drainage Anterior Chest Abscess  08713 - AL INCISION & DRAINAGE ABSCESS COMPLICATED/MULTIPLE      Surgeons      * Madison Haro - Primary    Resident/Fellow/Other Assistant:  Surgeon(s) and Role:     * Joshua Shin MD - Resident - Assisting     * Eden Means MD - Resident - Assisting    Procedure Summary  Anesthesia: * No anesthesia type entered *  ASA: II  Anesthesia Staff: Anesthesiologist: Terry Howard MD  Anesthesia Resident: Iman Brunson DO  Estimated Blood Loss: 25mL  Intra-op Medications: Administrations occurring from 0045 to 0135 on 24:  * No intraprocedure medications in log *           Anesthesia Record               Intraprocedure I/O Totals          Intake    LR bolus 1000.00 mL    Propofol Drip 0.00 mL    The total shown is the total volume documented since Anesthesia Start was filed.    Total Intake 1000 mL       Output    Est. Blood Loss 25 mL    Total Output 25 mL       Net    Net Volume 975 mL          Specimen:   ID Type Source Tests Collected by Time   A : CHEST ABSCESS #1 Swab ABSCESS TISSUE/WOUND CULTURE/SMEAR Joshua Shin MD 2024 0133   B : CHEST ABSCESS #2 Swab ABSCESS TISSUE/WOUND CULTURE/SMEAR Joshua Shin MD 2024 0147   C : ABSCESS TISSUE Tissue ABSCESS TISSUE/WOUND CULTURE/SMEAR Joshua Shin MD 2024 0155        Staff:   Circulator: Romi Servin RN; Ji Medina RN  Scrub Person: Casey Ludwig         Drains and/or Catheters:   Open Drain Right;Superior Chest  (Active)   Site Description Bleeding 24   Dressing Status New drainage 24   Drainage Appearance Serosanguineous 24   Output (mL) 0  mL 02/07/24 0329       Open Drain Right;Superior Chest  (Active)   Site Description Healing 02/08/24 0827   Dressing Status Clean;Dry 02/08/24 0827   Drainage Appearance None 02/08/24 0827   Output (mL) 0 mL 02/07/24 0329       Tourniquet Times: N/A        Implants: N/A    Findings: Right sided chest wall abscess that tracks from the third rib superiorly along the internal jugular vein with purulent drainage some loculations and necrotic tissue. 2 sets of wound cultures and 1 tissue culture were obtained. 2 penrose drains were placed. Abscess cavity was packed with nu gauze, covered with 2 ABD pads and secured with pressure dressing.      Indications: Nicki Velásquez is an 33 y.o. male who is having surgery for Chest wall abscess [L02.213].     The patient was seen in the ER. The risks, benefits, complications, treatment options, non-operative alternatives, expected recovery and outcomes were discussed with the patient. The patient concurred with the proposed plan, giving informed consent.  Preoperative antibiotics are not indicated. Venous thrombosis prophylaxis are not indicated.because the patient is on scheduled antibiotics    Procedure Details: Mr. Velásquez was brought into the OR and a pink sheet time out was performed. He was then given MAC with sedation and prepped and draped in the usual sterile fashion. A pre procedure pause was performed. The first of three incisions was made at the inferior aspect of the abscess. First a small amount of serosanguinous material came out. This was followed by pus.  Cultures were taken. The few loculations were taken down digitally.  We placed our suction up into the cavity and drained more pus. A second incision was placed mid way up the abscess and more pus was drained. A third incision was made more superiorly as well. We ran a raytec through to debride. We irrigated copiously. We placed two penrose drains.     Complications:  None; patient tolerated the procedure well.     Disposition: PACU - hemodynamically stable.  Condition: stable         Additional Details: None    Attending Attestation:     Madison Haro  Phone Number: [phone]

## 2024-02-12 LAB
BACTERIA SPEC CULT: ABNORMAL
GRAM STN SPEC: ABNORMAL
GRAM STN SPEC: ABNORMAL

## 2024-02-16 ENCOUNTER — APPOINTMENT (OUTPATIENT)
Dept: SURGERY | Facility: CLINIC | Age: 34
End: 2024-02-16
Payer: MEDICAID

## 2024-02-16 NOTE — PROGRESS NOTES
[facility]  TRAUMA CLINIC PROGRESS NOTE    Patient Name: Nicki Velásquez  MRN: [id number]  Admit Date:   : 1990  AGE: 33 y.o.   GENDER: male  ==============================================================================  CHIEF COMPLAINT:   Post operative appointment     OTHER MEDICAL PROBLEMS:  Anxiety    INCIDENTAL FINDINGS:  NA    PROCEDURES:  24: Incision and Drainage Anterior Chest Abscess     PATHOLOGY:  NA  ==============================================================================  TODAY'S ASSESSMENT AND PLAN OF CARE:  Wound care and Penrose drain removal   - both Penrose drains were removed without issue.   - There was no more drainage from these wounds and the wound beds appear to be healing nicely   - The wound to the right lateral neck is mostly healed.  We will cover this with Xeroform and a 2 x 2 for the next 4 days.   - The next 2 wounds lower down on the chest will be packed with moist to dry 1 inch Nu Gauze, covered with a 2 x 2.   - The entire area will then be covered with an ABD and paper tape   - Please change this dressing 2 times a day.  You are allowed to shower.  Please remove these dressings prior to showering.  Do not scrub at these wounds, allow warm soapy water to wash over the wounds.  Once out of the shower, pat dry and redress as described above.   - Dressing supplies sent home with patient.   - Please do not soak in a tub or hot tub until these wounds are completely healed   - Please do not put lotion, ointments, creams on these wounds until the skin is completely healed.    FOLLOW UP/CALL  -2 weeks trauma/ACS follow up for wound care  - May return to work or school on going back and 2024  - Return to clinic or ER sooner if pt. has any development of erythema, drainage, swelling, pain, fevers, or chills  - If you have questions or concerns that are not urgent, please feel free to call  [phone].  - Call [phone] to make  additional appointment(s) as needed if unable to reschedule in office today    ==============================================================================  HISTORY OF PRESENT ILLNESS  Antonio Velásquez is a 33-year-old man who presents to clinic today in follow-up for a hospitalization from February 7, 2024 through February 8, 2024 for a chest wall abscess.  This abscess developed over a period of approximately 2 weeks.  He went to the OR for incision and drainage and 2 Penrose placements.  Since his hospitalization he has been feeling well.  He completed his course of Augmentin.  His pain is well-controlled.  He has been showering twice a day.  He states that the wounds are not draining a lot, sometimes a little bit of blood, sometimes some nonodorous yellow discharge.  He is eating, drinking, voiding and having flatus, bowel movements.   MEDICAL HISTORY / ROS:  Admission history and ROS reviewed.   Patient denies:  fevers; chills; headache;  dizziness; chest pain; shortness of breath; nausea/vomiting/diarrhea/constipation; new/worsening abdominal pain or numbness/tingling/weakness of extremities.   Pertinent changes as follows:  NA    PHYSICAL EXAM:  GCS 15, A+OX3, RRR, S1, S2, CTA=, no increased WOB. Abd soft, nt, nd. MAEx4, RAYSA 5/5 x4, no extremity edema noted. 2+pp.   Wound location: Right lateral neck: Measures 2-1/2 cm x 0.5 cm, no depth.  Wound bed with healthy granulation tissue  Wound location: Right upper chest overlying clavicle: 3 cm x 1-1/2 cm, minimal depth.  Wound bed with healthy granulation tissue, small amount of fibrinous exudate.  Wound location: Right upper chest inferior to clavicle 2-1/2 cm x 4 and half centimeters, minimal  depth.  Wound bed with healthy granulation tissue, small amount of fibrinous exudate.    LABS:  No results found for this or any previous visit (from the past 24 hour(s)).  MEDICATIONS:  Current Outpatient Medications   Medication Sig Dispense Refill    acetaminophen  (Tylenol) 325 mg tablet Take 2 tablets (650 mg) by mouth every 6 hours if needed for mild pain (1 - 3) for up to 14 days. 112 tablet 0    ibuprofen 600 mg tablet Take 1 tablet (600 mg) by mouth every 6 hours if needed for mild pain (1 - 3) for up to 14 days. 56 tablet 0    oxyCODONE (Roxicodone) 5 mg immediate release tablet Take 1 tablet (5 mg) by mouth every 6 hours if needed for severe pain (7 - 10) (Take for severe pain.) for up to 10 doses. 10 tablet 0     No current facility-administered medications for this visit.       IMAGING SUMMARY:  (summary of new imaging findings, not a copy of dictation)  NA    I have reviewed all laboratory and imaging results ordered/pertinent for today's encounter.

## 2024-02-21 ENCOUNTER — CLINICAL SUPPORT (OUTPATIENT)
Dept: SURGERY | Facility: CLINIC | Age: 34
End: 2024-02-21
Payer: MEDICAID

## 2024-02-21 VITALS
BODY MASS INDEX: 25.33 KG/M2 | DIASTOLIC BLOOD PRESSURE: 76 MMHG | WEIGHT: 171 LBS | HEART RATE: 69 BPM | HEIGHT: 69 IN | RESPIRATION RATE: 16 BRPM | SYSTOLIC BLOOD PRESSURE: 124 MMHG

## 2024-02-21 DIAGNOSIS — Z51.89 VISIT FOR WOUND CARE: ICD-10-CM

## 2024-02-21 DIAGNOSIS — Z48.03 CHANGE OR REMOVAL OF DRAINS: Primary | ICD-10-CM

## 2024-02-21 DIAGNOSIS — Z71.9 HEALTH EDUCATION/COUNSELING: ICD-10-CM

## 2024-02-21 PROCEDURE — 99024 POSTOP FOLLOW-UP VISIT: CPT | Performed by: NURSE PRACTITIONER

## 2024-02-21 ASSESSMENT — PAIN SCALES - GENERAL: PAINLEVEL: 4

## 2024-02-27 NOTE — PROGRESS NOTES
[facility]  TRAUMA CLINIC PROGRESS NOTE    Patient Name: Nicki Velásquez  MRN: [id number]  Admit Date:   : 1990  AGE: 33 y.o.   GENDER: male  ==============================================================================  CHIEF COMPLAINT:   Post operative appointment      OTHER MEDICAL PROBLEMS:  Anxiety     INCIDENTAL FINDINGS:  NA     PROCEDURES:  24: Incision and Drainage Anterior Chest Abscess      PATHOLOGY:  NA    =======================================================================  TODAY'S ASSESSMENT AND PLAN OF CARE:  WOUND CARE  - Take daily showers  - Allow warm, soapy water to wash over wound  - Do not scrub at the wound  - When out of the shower, gently pat the wound dry.  - Do not apply lotions, ointments or creams  - Avoid soaking in bodies of water (bathtub, hot tubs, pools, lakes, etc) until wound is completely healed  - No heavy lifting > 15 lbs until 6 weeks after surgery    PACKING THE WOUND  - after your shower take 1 inch Nugauze, moisten with Sterile saline and insert it into the opening.    * the Nugauze should be moist, not soaking wet, please make sure to wring it out.  - cover the packed area with a 4x4 and paper tape.     FOLLOW UP/CALL  - 2-3 week trauma/ACS follow up for wound care  - May return to work or school on TBD at later appointment  - Return to clinic or ER sooner if pt. has any development of erythema, drainage, swelling, pain, fevers, or chills  - If you have questions or concerns that are not urgent, please feel free to call  [phone].  - Call [phone] to make additional appointment(s) as needed if unable to reschedule in office today    ==============================================================================  HISTORY OF PRESENT ILLNESS  Antonio Velásquez is a 33-year-old man who presents to clinic today in follow-up for a hospitalization from 2024 through 2024 for a chest wall abscess.  This  abscess developed over a period of approximately 2 weeks.  He went to the OR for incision and drainage and 2 Penrose placements.  Since his hospitalization he has been feeling well.      At his appointment on 2/21/2024 he completed his course of Augmentin, pain is well-controlled. He states that the wounds are not draining a lot, sometimes a little bit of blood, sometimes some nonodorous yellow discharge. Penrose drains were removed without issue, right lateral neck is mostly healed will cover this with Xeroform and a 2 x 2 for the next 4 days. The next 2 wounds lower down on the chest will be packed with moist to dry 1 inch Nu Gauze, covered with a 2 x 2. The entire area will then be covered with an ABD and paper tape.     Patient presents today for wound check    Patient is eating, drinking, voiding and having flatus, bowel movements.     MEDICAL HISTORY / ROS:  Admission history and ROS reviewed.   Patient denies:  fevers; chills; headache;  dizziness; chest pain; shortness of breath; nausea/vomiting/diarrhea/constipation; new/worsening abdominal pain or numbness/tingling/weakness of extremities.   Pertinent changes as follows:  NA    PHYSICAL EXAM:  GCS 15, A+OX3, RRR, S1, S2, CTA=, no increased WOB. Abd soft, nt, nd. MAEx4, RAYSA 5/5 x4, no extremity edema noted. 2+pp.   Wound location: Superior neck wound  Wound length: 3 cm   Wound width: 0.5 cm  Wound depth: 0 cm  Wound description: healthy beefy red tissue noted.     Wound location: Inferior neck wound  Wound length: 5 cm  Wound width: 2.5 cm  Wound depth: 0 cm   Wound description:  healthy beefy red tissue noted    LABS:  No results found for this or any previous visit (from the past 24 hour(s)).  MEDICATIONS:  Current Outpatient Medications   Medication Sig Dispense Refill    oxyCODONE (Roxicodone) 5 mg immediate release tablet Take 1 tablet (5 mg) by mouth every 6 hours if needed for severe pain (7 - 10) (Take for severe pain.) for up to 10 doses. 10  tablet 0     No current facility-administered medications for this visit.       IMAGING SUMMARY:  (summary of new imaging findings, not a copy of dictation)  NA    I have reviewed all laboratory and imaging results ordered/pertinent for today's encounter.

## 2024-03-06 ENCOUNTER — CLINICAL SUPPORT (OUTPATIENT)
Dept: SURGERY | Facility: CLINIC | Age: 34
End: 2024-03-06
Payer: MEDICAID

## 2024-03-06 VITALS
HEART RATE: 79 BPM | DIASTOLIC BLOOD PRESSURE: 78 MMHG | TEMPERATURE: 97.9 F | BODY MASS INDEX: 25.18 KG/M2 | HEIGHT: 69 IN | SYSTOLIC BLOOD PRESSURE: 129 MMHG | WEIGHT: 170 LBS | OXYGEN SATURATION: 100 %

## 2024-03-06 DIAGNOSIS — Z51.89 ENCOUNTER FOR WOUND CARE: Primary | ICD-10-CM

## 2024-03-06 DIAGNOSIS — Z71.9 HEALTH EDUCATION/COUNSELING: ICD-10-CM

## 2024-03-06 PROCEDURE — 99213 OFFICE O/P EST LOW 20 MIN: CPT | Performed by: NURSE PRACTITIONER

## 2024-03-06 ASSESSMENT — PAIN SCALES - GENERAL: PAINLEVEL: 4

## 2024-03-06 ASSESSMENT — ENCOUNTER SYMPTOMS
DEPRESSION: 0
OCCASIONAL FEELINGS OF UNSTEADINESS: 0
LOSS OF SENSATION IN FEET: 0

## 2024-03-06 ASSESSMENT — PATIENT HEALTH QUESTIONNAIRE - PHQ9
1. LITTLE INTEREST OR PLEASURE IN DOING THINGS: NOT AT ALL
SUM OF ALL RESPONSES TO PHQ9 QUESTIONS 1 & 2: 0
2. FEELING DOWN, DEPRESSED OR HOPELESS: NOT AT ALL

## 2024-03-22 NOTE — PROGRESS NOTES
[facility]  TRAUMA CLINIC PROGRESS NOTE    Patient Name: Nicki Velásquez  MRN: [id number]  Admit Date:   : 1990  AGE: 33 y.o.   GENDER: male  ==============================================================================  CHIEF COMPLAINT:   Wound check     OTHER MEDICAL PROBLEMS:  Anxiety     INCIDENTAL FINDINGS:  NA     PROCEDURES:  24: Incision and Drainage Anterior Chest Abscess      PATHOLOGY:  NA  ==============================================================================  TODAY'S ASSESSMENT AND PLAN OF CARE:  Wound check   Wound is healing appropriately, wound care addressed and patient is comfortable continuing his wound care   A referral to plastics has been made for possible skin graft    No further appointments with Trauma/ACS clinic    WOUND CARE  - Take daily showers  - Allow warm, soapy water to wash over wound  - Do not scrub at the wound  - When out of the shower, gently pat the wound dry.  - Do not apply lotions, ointments or creams  - Avoid soaking in bodies of water (bathtub, hot tubs, pools, lakes, etc) until wound is completely healed      FOLLOW UP/CALL  - No further trauma/ACS follow up   - May return to work or school without restrictions  - Return to clinic or ER sooner if pt. has any development of erythema, drainage, swelling, pain, fevers, or chills  - If you have questions or concerns that are not urgent, please feel free to call  [phone].  - Call [phone] to make additional appointment(s) as needed if unable to reschedule in office today    ==============================================================================  HISTORY OF PRESENT ILLNESS  Antonio Velásquez is a 33-year-old man who presents to clinic today in follow-up for a hospitalization from 2024 through 2024 for a chest wall abscess.  This abscess developed over a period of approximately 2 weeks.  He went to the OR for incision and drainage and 2  Penrose placements.  Since his hospitalization he has been feeling well.       At his appointment on 2/21/2024 he completed his course of Augmentin, pain is well-controlled. He states that the wounds are not draining a lot, sometimes a little bit of blood, sometimes some nonodorous yellow discharge. Penrose drains were removed without issue, right lateral neck is mostly healed will cover this with Xeroform and a 2 x 2 for the next 4 days. The next 2 wounds lower down on the chest will be packed with moist to dry 1 inch Nu Gauze, covered with a 2 x 2. The entire area will then be covered with an ABD and paper tape.      At his appointment on 3/6/2024 wound care was 1 inches nugauze WTD to be changed BID. Patient presents today for wound check.    He is doing well, he reports feeling comfortable doing his dressing changes at home    Patient is eating, drinking, voiding and having flatus, bowel movements.     MEDICAL HISTORY / ROS:  Admission history and ROS reviewed.   Patient denies:  fevers; chills; headache;  dizziness; chest pain; shortness of breath; nausea/vomiting/diarrhea/constipation; new/worsening abdominal pain or numbness/tingling/weakness of extremities.   Pertinent changes as follows:  None    PHYSICAL EXAM:  GCS 15, A+OX3, RRR, S1, S2, CTA=, no increased WOB.   Abd soft, nt, nd. MAEx4, RAYSA 5/5 x4, no extremity edema noted. 2+pp.     Wound location: chest wall, inferior aspect of neck  Wound depth: superficial  Wound description: red tissue bed with appropriate tissue granulation, clean lines, no discharge or drainage, no surrounding erythema/edema    LABS:  No results found for this or any previous visit (from the past 24 hour(s)).  MEDICATIONS:  Current Outpatient Medications   Medication Sig Dispense Refill    oxyCODONE (Roxicodone) 5 mg immediate release tablet Take 1 tablet (5 mg) by mouth every 6 hours if needed for severe pain (7 - 10) (Take for severe pain.) for up to 10 doses. (Patient not  taking: Reported on 3/6/2024) 10 tablet 0     No current facility-administered medications for this visit.       IMAGING SUMMARY:  (summary of new imaging findings, not a copy of dictation)  NA    I have reviewed all laboratory and imaging results ordered/pertinent for today's encounter.

## 2024-03-27 ENCOUNTER — CLINICAL SUPPORT (OUTPATIENT)
Dept: SURGERY | Facility: CLINIC | Age: 34
End: 2024-03-27
Payer: MEDICAID

## 2024-03-27 VITALS
DIASTOLIC BLOOD PRESSURE: 73 MMHG | WEIGHT: 163.9 LBS | BODY MASS INDEX: 24.27 KG/M2 | HEART RATE: 89 BPM | SYSTOLIC BLOOD PRESSURE: 129 MMHG | HEIGHT: 69 IN

## 2024-03-27 DIAGNOSIS — Z87.828 H/O CHEST WOUND: Primary | ICD-10-CM

## 2024-12-09 ENCOUNTER — HOSPITAL ENCOUNTER (EMERGENCY)
Facility: HOSPITAL | Age: 34
Discharge: HOME | End: 2024-12-09
Payer: MEDICAID

## 2024-12-09 ENCOUNTER — CLINICAL SUPPORT (OUTPATIENT)
Dept: EMERGENCY MEDICINE | Facility: HOSPITAL | Age: 34
End: 2024-12-09
Payer: MEDICAID

## 2024-12-09 VITALS
WEIGHT: 161 LBS | TEMPERATURE: 98.2 F | SYSTOLIC BLOOD PRESSURE: 129 MMHG | BODY MASS INDEX: 23.85 KG/M2 | RESPIRATION RATE: 16 BRPM | DIASTOLIC BLOOD PRESSURE: 85 MMHG | OXYGEN SATURATION: 98 % | HEART RATE: 98 BPM | HEIGHT: 69 IN

## 2024-12-09 DIAGNOSIS — R42 LIGHTHEADEDNESS: Primary | ICD-10-CM

## 2024-12-09 DIAGNOSIS — F41.9 ANXIETY: ICD-10-CM

## 2024-12-09 DIAGNOSIS — L66.3 DISSECTING CELLULITIS OF SCALP: ICD-10-CM

## 2024-12-09 LAB
ATRIAL RATE: 87 BPM
P AXIS: 78 DEGREES
P OFFSET: 186 MS
P ONSET: 134 MS
PR INTERVAL: 158 MS
Q ONSET: 213 MS
QRS COUNT: 14 BEATS
QRS DURATION: 96 MS
QT INTERVAL: 362 MS
QTC CALCULATION(BAZETT): 435 MS
QTC FREDERICIA: 409 MS
R AXIS: -51 DEGREES
T AXIS: 59 DEGREES
T OFFSET: 394 MS
VENTRICULAR RATE: 87 BPM

## 2024-12-09 PROCEDURE — 93010 ELECTROCARDIOGRAM REPORT: CPT | Performed by: PHYSICIAN ASSISTANT

## 2024-12-09 PROCEDURE — 2500000001 HC RX 250 WO HCPCS SELF ADMINISTERED DRUGS (ALT 637 FOR MEDICARE OP): Performed by: PHYSICIAN ASSISTANT

## 2024-12-09 PROCEDURE — 93005 ELECTROCARDIOGRAM TRACING: CPT

## 2024-12-09 PROCEDURE — 99283 EMERGENCY DEPT VISIT LOW MDM: CPT

## 2024-12-09 PROCEDURE — 99284 EMERGENCY DEPT VISIT MOD MDM: CPT | Performed by: PHYSICIAN ASSISTANT

## 2024-12-09 RX ORDER — HYDROXYZINE HYDROCHLORIDE 25 MG/1
25 TABLET, FILM COATED ORAL ONCE
Status: COMPLETED | OUTPATIENT
Start: 2024-12-09 | End: 2024-12-09

## 2024-12-09 RX ORDER — DOXYCYCLINE 100 MG/1
100 TABLET ORAL 2 TIMES DAILY
Qty: 20 TABLET | Refills: 0 | Status: SHIPPED | OUTPATIENT
Start: 2024-12-09 | End: 2024-12-19

## 2024-12-09 RX ORDER — DOXYCYCLINE HYCLATE 100 MG
100 TABLET ORAL ONCE
Status: COMPLETED | OUTPATIENT
Start: 2024-12-09 | End: 2024-12-09

## 2024-12-09 RX ORDER — HYDROXYZINE HYDROCHLORIDE 25 MG/1
25 TABLET, FILM COATED ORAL EVERY 6 HOURS PRN
Qty: 12 TABLET | Refills: 0 | Status: SHIPPED | OUTPATIENT
Start: 2024-12-09 | End: 2024-12-12

## 2024-12-09 ASSESSMENT — PAIN DESCRIPTION - PROGRESSION: CLINICAL_PROGRESSION: NOT CHANGED

## 2024-12-09 ASSESSMENT — PAIN SCALES - GENERAL: PAINLEVEL_OUTOF10: 0 - NO PAIN

## 2024-12-09 ASSESSMENT — PAIN - FUNCTIONAL ASSESSMENT: PAIN_FUNCTIONAL_ASSESSMENT: 0-10

## 2024-12-09 ASSESSMENT — COLUMBIA-SUICIDE SEVERITY RATING SCALE - C-SSRS
1. IN THE PAST MONTH, HAVE YOU WISHED YOU WERE DEAD OR WISHED YOU COULD GO TO SLEEP AND NOT WAKE UP?: NO
2. HAVE YOU ACTUALLY HAD ANY THOUGHTS OF KILLING YOURSELF?: NO
6. HAVE YOU EVER DONE ANYTHING, STARTED TO DO ANYTHING, OR PREPARED TO DO ANYTHING TO END YOUR LIFE?: NO

## 2024-12-09 NOTE — DISCHARGE INSTRUCTIONS
Take the doxycycline antibiotics twice daily.  Take the Atarax as needed for lightheadedness and anxiety.  Follow-up with the dermatology clinic, if you have not heard from them call [phone] or [phone]  Follow-up with your PCP for persistent lightheadedness/anxiety symptoms

## 2024-12-09 NOTE — ED PROVIDER NOTES
Emergency Department Provider Note        History of Present Illness     34 y.o. male with history of anxiety, abscess/cellulitis SP I&D this past February presenting complaining of lightheadedness/anxiety as well as scalp irritation.    States that he has felt lightheaded for about a week with his anxiety.  Denies palpitations, denies room spinning sensation, denies nausea or vomiting, denies syncope or near syncope.  Denies weakness or paresthesias.  States he has longstanding history of anxiety however has not been treated in a long time.  For his scalp, he states he it been irritated for few months, has not been to a doctor for it.  Denies any acute change to it.  Denies any fevers chills night sweats or rigors.  States that he just wanted to get checked out.  Denies any headache, neck pain, vision change.  States the location of his incision and drainage from the abscess has improved however he feels that his scalp is stemming back to that as there previously was cellulitis wrapping around the back of his neck to his scalp.      History reviewed. No pertinent past medical history.  History reviewed. No pertinent surgical history.  Social History     Socioeconomic History    Marital status: Single   Tobacco Use    Smoking status: Never    Smokeless tobacco: Never   Vaping Use    Vaping status: Never Used   Substance and Sexual Activity    Alcohol use: Yes     Alcohol/week: 1.0 standard drink of alcohol     Types: 1 Glasses of wine per week    Drug use: Never    Sexual activity: Yes     Social Drivers of Health     Financial Resource Strain: Low Risk  (2/7/2024)    Overall Financial Resource Strain (CARDIA)     Difficulty of Paying Living Expenses: Not hard at all   Transportation Needs: No Transportation Needs (2/7/2024)    PRAPARE - Transportation     Lack of Transportation (Medical): No     Lack of Transportation (Non-Medical): No   Housing Stability: Low Risk  (2/7/2024)    Housing Stability Vital Sign      Unable to Pay for Housing in the Last Year: No     Number of Places Lived in the Last Year: 1     Unstable Housing in the Last Year: No     No Known Allergies      External Records Reviewed including ED notes, H&P, Discharge Summary, outpatient PCP/specialist notes.  Physical Exam       Triage Vitals: T 36.8 °C (98.2 °F)  HR 98  /85  RR 16  O2 98 %    GEN: NAD  EYES:  EOMs grossly intact, anicteric sclera  AUDREY: Mucosa moist.  NECK: Supple.  CARD: RRR  PULMONARY: Moving air well. Clear all lung fields.  ABDOMEN: Soft, no guarding, no rigidity. Nontender. NABS  EXTREMITIES: Full ROM, no pitting edema,   SKIN: Intact, warm and dry diffuse bulkiness to the scalp with no localized fluctuance or induration, no erythema or warmth, nontender as seen below:    NEURO: Alert and oriented x 3, speech is clear, no obvious deficits noted.  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  On exam he is well-appearing ambulating [place].  Vital signs stable including afebrile with no tachycardia hypoxia or hypotension.  Lungs CTABL.  CV RRR.  Neurological intact with no focal findings.  Given that he is otherwise well-appearing with a benign exam and nonischemic EKG high suspicion for his anxiety attributing to his lightheadedness.  Will give him Atarax for this as well as prescription.  He is instructed to follow-up with PCP for persistent symptoms.  For his scalp that appears to be similar to dissecting cellulitis of the scalp.  There is no clear sign of focal infection/abscess requiring any surgical invention at this time.  We will start him on  doxycycline twice daily, first dose given the ED.  Referral is entered for dermatology, also provided the phone number if he has not heard from them.  Return precautions reviewed.    Diagnoses as of 12/09/24 0756   Lightheadedness   Dissecting cellulitis of scalp   Anxiety     No orders to display     Labs Reviewed - No data to display    ----------------------------------------------------------------------------------------------------------------------------    This note was dictated using a speech recognition program.  While an attempt was made at proof reading to minimize errors, minor errors in transcription may be present call for questions.     Aj Forbes PA-C  12/09/24 0801

## (undated) DEVICE — COLLECTION/DELIVERY SYSTEM, COPAN ESWAB, REG SIZE SWAB

## (undated) DEVICE — SUCTION TIP , YANKAUER, W/BULB SUCTION

## (undated) DEVICE — DRAPE PACK, UNIVERSAL II

## (undated) DEVICE — COVER, CART, 45 X 27 X 48 IN, CLEAR

## (undated) DEVICE — PACKING, PLAIN, CURITY, 1 IN X 5 YD, STERILE

## (undated) DEVICE — Device

## (undated) DEVICE — DRAIN, PENROSE, 0.25 X 18 IN, LATEX, STERILE

## (undated) DEVICE — SPONGE, LAP, XRAY DECT, 18IN X 18IN, W/MASTER DMT, STERILE

## (undated) DEVICE — IRRIGATION SET, CYSTOSCOPY, F/CONSTANT/INTERMITTENT, 8 GTT/CC, 77 IN

## (undated) DEVICE — GOWN, SURGICAL, SMARTGOWN, XLARGE, STERILE

## (undated) DEVICE — MANIFOLD, 4 PORT NEPTUNE STANDARD

## (undated) DEVICE — TIP, SUCTION, YANKAUER, BULB, ADULT

## (undated) DEVICE — STAPLER, SKIN PROXIMATE, 35 WIDE

## (undated) DEVICE — DRAPE, PAD, PREP, W/ 9 IN CUFF, 24 X 41, LF, NS